# Patient Record
Sex: FEMALE | Race: WHITE | Employment: OTHER | URBAN - METROPOLITAN AREA
[De-identification: names, ages, dates, MRNs, and addresses within clinical notes are randomized per-mention and may not be internally consistent; named-entity substitution may affect disease eponyms.]

---

## 2017-01-01 ENCOUNTER — PATIENT OUTREACH (OUTPATIENT)
Dept: CASE MANAGEMENT | Age: 82
End: 2017-01-01

## 2017-01-01 ENCOUNTER — HOSPITAL ENCOUNTER (OUTPATIENT)
Dept: PHYSICAL THERAPY | Age: 82
Discharge: HOME | End: 2017-08-30
Payer: MEDICARE

## 2017-01-01 ENCOUNTER — HOSPITAL ENCOUNTER (OUTPATIENT)
Dept: PHYSICAL THERAPY | Age: 82
Discharge: HOME | End: 2017-08-23
Payer: MEDICARE

## 2017-01-01 ENCOUNTER — APPOINTMENT (OUTPATIENT)
Dept: GENERAL RADIOLOGY | Age: 82
End: 2017-01-01
Attending: EMERGENCY MEDICINE
Payer: MEDICARE

## 2017-01-01 ENCOUNTER — HOSPITAL ENCOUNTER (OUTPATIENT)
Dept: CT IMAGING | Age: 82
Discharge: HOME | End: 2017-06-12
Payer: MEDICARE

## 2017-01-01 ENCOUNTER — HOSPITAL ENCOUNTER (OUTPATIENT)
Dept: PHYSICAL THERAPY | Age: 82
Discharge: HOME | End: 2017-07-26
Payer: MEDICARE

## 2017-01-01 ENCOUNTER — HOSPITAL ENCOUNTER (OUTPATIENT)
Dept: PHYSICAL THERAPY | Age: 82
Discharge: HOME | End: 2017-07-19
Payer: MEDICARE

## 2017-01-01 ENCOUNTER — HOSPITAL ENCOUNTER (EMERGENCY)
Age: 82
Discharge: HOME | End: 2017-06-14
Attending: EMERGENCY MEDICINE
Payer: MEDICARE

## 2017-01-01 ENCOUNTER — HOSPITAL ENCOUNTER (OUTPATIENT)
Dept: PHYSICAL THERAPY | Age: 82
Discharge: HOME | End: 2017-08-09
Payer: MEDICARE

## 2017-01-01 ENCOUNTER — HOSPITAL ENCOUNTER (EMERGENCY)
Age: 82
Discharge: HOME | End: 2017-05-12
Attending: EMERGENCY MEDICINE
Payer: MEDICARE

## 2017-01-01 ENCOUNTER — HOSPITAL ENCOUNTER (OUTPATIENT)
Dept: PHYSICAL THERAPY | Age: 82
Discharge: HOME | End: 2017-08-16
Payer: MEDICARE

## 2017-01-01 ENCOUNTER — HOSPITAL ENCOUNTER (OUTPATIENT)
Dept: PHYSICAL THERAPY | Age: 82
Discharge: HOME | End: 2017-08-02
Payer: MEDICARE

## 2017-01-01 VITALS
OXYGEN SATURATION: 97 % | BODY MASS INDEX: 29.16 KG/M2 | RESPIRATION RATE: 20 BRPM | DIASTOLIC BLOOD PRESSURE: 58 MMHG | WEIGHT: 175 LBS | TEMPERATURE: 97.4 F | HEART RATE: 62 BPM | HEIGHT: 65 IN | SYSTOLIC BLOOD PRESSURE: 124 MMHG

## 2017-01-01 VITALS
RESPIRATION RATE: 18 BRPM | HEIGHT: 69 IN | DIASTOLIC BLOOD PRESSURE: 81 MMHG | WEIGHT: 175 LBS | OXYGEN SATURATION: 95 % | HEART RATE: 67 BPM | BODY MASS INDEX: 25.92 KG/M2 | TEMPERATURE: 98.5 F | SYSTOLIC BLOOD PRESSURE: 183 MMHG

## 2017-01-01 DIAGNOSIS — R47.1 DYSARTHRIA: Primary | ICD-10-CM

## 2017-01-01 DIAGNOSIS — R07.9 CHEST PAIN, UNSPECIFIED TYPE: Primary | ICD-10-CM

## 2017-01-01 DIAGNOSIS — R47.81 SLURRED SPEECH: ICD-10-CM

## 2017-01-01 DIAGNOSIS — I10 ESSENTIAL HYPERTENSION: ICD-10-CM

## 2017-01-01 DIAGNOSIS — R29.810 FACIAL DROOP: ICD-10-CM

## 2017-01-01 DIAGNOSIS — N39.0 URINARY TRACT INFECTION WITHOUT HEMATURIA, SITE UNSPECIFIED: ICD-10-CM

## 2017-01-01 DIAGNOSIS — E86.0 DEHYDRATION: ICD-10-CM

## 2017-01-01 DIAGNOSIS — R26.81 UNSTEADY GAIT: ICD-10-CM

## 2017-01-01 LAB
ALBUMIN SERPL BCP-MCNC: 3.2 G/DL (ref 3.2–4.6)
ALBUMIN SERPL BCP-MCNC: 3.4 G/DL (ref 3.2–4.6)
ALBUMIN/GLOB SERPL: 0.7 {RATIO} (ref 1.2–3.5)
ALBUMIN/GLOB SERPL: 0.8 {RATIO} (ref 1.2–3.5)
ALP SERPL-CCNC: 105 U/L (ref 50–136)
ALP SERPL-CCNC: 76 U/L (ref 50–136)
ALT SERPL-CCNC: 18 U/L (ref 12–65)
ALT SERPL-CCNC: 22 U/L (ref 12–65)
ANION GAP BLD CALC-SCNC: 10 MMOL/L (ref 7–16)
ANION GAP BLD CALC-SCNC: 7 MMOL/L (ref 7–16)
APPEARANCE UR: ABNORMAL
AST SERPL W P-5'-P-CCNC: 25 U/L (ref 15–37)
AST SERPL W P-5'-P-CCNC: 27 U/L (ref 15–37)
ATRIAL RATE: 64 BPM
ATRIAL RATE: 69 BPM
BACTERIA SPEC CULT: ABNORMAL
BACTERIA URNS QL MICRO: ABNORMAL /HPF
BACTERIA URNS QL MICRO: ABNORMAL /HPF
BASOPHILS # BLD AUTO: 0 K/UL (ref 0–0.2)
BASOPHILS # BLD AUTO: 0 K/UL (ref 0–0.2)
BASOPHILS # BLD: 0 % (ref 0–2)
BASOPHILS # BLD: 0 % (ref 0–2)
BILIRUB SERPL-MCNC: 0.3 MG/DL (ref 0.2–1.1)
BILIRUB SERPL-MCNC: 0.5 MG/DL (ref 0.2–1.1)
BILIRUB UR QL: NEGATIVE
BNP SERPL-MCNC: 202 PG/ML
BUN SERPL-MCNC: 20 MG/DL (ref 8–23)
BUN SERPL-MCNC: 29 MG/DL (ref 8–23)
CALCIUM SERPL-MCNC: 9.1 MG/DL (ref 8.3–10.4)
CALCIUM SERPL-MCNC: 9.2 MG/DL (ref 8.3–10.4)
CALCULATED P AXIS, ECG09: 48 DEGREES
CALCULATED P AXIS, ECG09: 88 DEGREES
CALCULATED R AXIS, ECG10: -2 DEGREES
CALCULATED R AXIS, ECG10: 19 DEGREES
CALCULATED T AXIS, ECG11: 138 DEGREES
CALCULATED T AXIS, ECG11: 159 DEGREES
CASTS URNS QL MICRO: 0 /LPF
CHLORIDE SERPL-SCNC: 105 MMOL/L (ref 98–107)
CHLORIDE SERPL-SCNC: 108 MMOL/L (ref 98–107)
CO2 SERPL-SCNC: 28 MMOL/L (ref 21–32)
CO2 SERPL-SCNC: 30 MMOL/L (ref 21–32)
COLOR UR: YELLOW
CREAT SERPL-MCNC: 0.94 MG/DL (ref 0.6–1)
CREAT SERPL-MCNC: 1.44 MG/DL (ref 0.6–1)
CRYSTALS URNS QL MICRO: 0 /LPF
DIAGNOSIS, 93000: NORMAL
DIAGNOSIS, 93000: NORMAL
DIFFERENTIAL METHOD BLD: ABNORMAL
DIFFERENTIAL METHOD BLD: ABNORMAL
EOSINOPHIL # BLD: 0 K/UL (ref 0–0.8)
EOSINOPHIL # BLD: 0.1 K/UL (ref 0–0.8)
EOSINOPHIL NFR BLD: 1 % (ref 0.5–7.8)
EOSINOPHIL NFR BLD: 2 % (ref 0.5–7.8)
EPI CELLS #/AREA URNS HPF: ABNORMAL /HPF
EPI CELLS #/AREA URNS HPF: ABNORMAL /HPF
ERYTHROCYTE [DISTWIDTH] IN BLOOD BY AUTOMATED COUNT: 14.6 % (ref 11.9–14.6)
ERYTHROCYTE [DISTWIDTH] IN BLOOD BY AUTOMATED COUNT: 15.1 % (ref 11.9–14.6)
GLOBULIN SER CALC-MCNC: 4.5 G/DL (ref 2.3–3.5)
GLOBULIN SER CALC-MCNC: 4.7 G/DL (ref 2.3–3.5)
GLUCOSE SERPL-MCNC: 160 MG/DL (ref 65–100)
GLUCOSE SERPL-MCNC: 83 MG/DL (ref 65–100)
GLUCOSE UR STRIP.AUTO-MCNC: NEGATIVE MG/DL
HCT VFR BLD AUTO: 41.9 % (ref 35.8–46.3)
HCT VFR BLD AUTO: 42.5 % (ref 35.8–46.3)
HGB BLD-MCNC: 13.9 G/DL (ref 11.7–15.4)
HGB BLD-MCNC: 14 G/DL (ref 11.7–15.4)
HGB UR QL STRIP: NEGATIVE
IMM GRANULOCYTES # BLD: 0 K/UL (ref 0–0.5)
IMM GRANULOCYTES # BLD: 0 K/UL (ref 0–0.5)
IMM GRANULOCYTES NFR BLD AUTO: 0.3 % (ref 0–5)
IMM GRANULOCYTES NFR BLD AUTO: 0.3 % (ref 0–5)
KETONES UR QL STRIP.AUTO: NEGATIVE MG/DL
LEUKOCYTE ESTERASE UR QL STRIP.AUTO: ABNORMAL
LYMPHOCYTES # BLD AUTO: 21 % (ref 13–44)
LYMPHOCYTES # BLD AUTO: 36 % (ref 13–44)
LYMPHOCYTES # BLD: 1.6 K/UL (ref 0.5–4.6)
LYMPHOCYTES # BLD: 2.7 K/UL (ref 0.5–4.6)
MCH RBC QN AUTO: 28.1 PG (ref 26.1–32.9)
MCH RBC QN AUTO: 28.4 PG (ref 26.1–32.9)
MCHC RBC AUTO-ENTMCNC: 32.9 G/DL (ref 31.4–35)
MCHC RBC AUTO-ENTMCNC: 33.2 G/DL (ref 31.4–35)
MCV RBC AUTO: 84.8 FL (ref 79.6–97.8)
MCV RBC AUTO: 86.2 FL (ref 79.6–97.8)
MONOCYTES # BLD: 0.3 K/UL (ref 0.1–1.3)
MONOCYTES # BLD: 0.4 K/UL (ref 0.1–1.3)
MONOCYTES NFR BLD AUTO: 5 % (ref 4–12)
MONOCYTES NFR BLD AUTO: 6 % (ref 4–12)
MUCOUS THREADS URNS QL MICRO: 0 /LPF
NEUTS SEG # BLD: 4.2 K/UL (ref 1.7–8.2)
NEUTS SEG # BLD: 5.6 K/UL (ref 1.7–8.2)
NEUTS SEG NFR BLD AUTO: 56 % (ref 43–78)
NEUTS SEG NFR BLD AUTO: 73 % (ref 43–78)
NITRITE UR QL STRIP.AUTO: POSITIVE
OTHER OBSERVATIONS,UCOM: ABNORMAL
OTHER OBSERVATIONS,UCOM: ABNORMAL
P-R INTERVAL, ECG05: 190 MS
P-R INTERVAL, ECG05: 196 MS
PH UR STRIP: 7 [PH] (ref 5–9)
PLATELET # BLD AUTO: 238 K/UL (ref 150–450)
PLATELET # BLD AUTO: 316 K/UL (ref 150–450)
PMV BLD AUTO: 10.1 FL (ref 10.8–14.1)
PMV BLD AUTO: 10.2 FL (ref 10.8–14.1)
POTASSIUM SERPL-SCNC: 3.8 MMOL/L (ref 3.5–5.1)
POTASSIUM SERPL-SCNC: 4 MMOL/L (ref 3.5–5.1)
PROT SERPL-MCNC: 7.9 G/DL (ref 6.3–8.2)
PROT SERPL-MCNC: 7.9 G/DL (ref 6.3–8.2)
PROT UR STRIP-MCNC: NEGATIVE MG/DL
Q-T INTERVAL, ECG07: 428 MS
Q-T INTERVAL, ECG07: 446 MS
QRS DURATION, ECG06: 100 MS
QRS DURATION, ECG06: 98 MS
QTC CALCULATION (BEZET), ECG08: 458 MS
QTC CALCULATION (BEZET), ECG08: 460 MS
RBC # BLD AUTO: 4.93 M/UL (ref 4.05–5.25)
RBC # BLD AUTO: 4.94 M/UL (ref 4.05–5.25)
RBC #/AREA URNS HPF: ABNORMAL /HPF
RBC #/AREA URNS HPF: ABNORMAL /HPF
SERVICE CMNT-IMP: ABNORMAL
SERVICE CMNT-IMP: ABNORMAL
SODIUM SERPL-SCNC: 143 MMOL/L (ref 136–145)
SODIUM SERPL-SCNC: 145 MMOL/L (ref 136–145)
SP GR UR REFRACTOMETRY: 1.01 (ref 1–1.02)
TROPONIN I BLD-MCNC: 0 NG/ML (ref 0–0.08)
TROPONIN I BLD-MCNC: 0.01 NG/ML (ref 0–0.08)
TROPONIN I SERPL-MCNC: <0.02 NG/ML (ref 0.02–0.05)
UROBILINOGEN UR QL STRIP.AUTO: 0.2 EU/DL (ref 0.2–1)
VENTRICULAR RATE, ECG03: 64 BPM
VENTRICULAR RATE, ECG03: 69 BPM
WBC # BLD AUTO: 7.6 K/UL (ref 4.3–11.1)
WBC # BLD AUTO: 7.6 K/UL (ref 4.3–11.1)
WBC URNS QL MICRO: >100 /HPF
WBC URNS QL MICRO: ABNORMAL /HPF

## 2017-01-01 PROCEDURE — 87186 SC STD MICRODIL/AGAR DIL: CPT | Performed by: EMERGENCY MEDICINE

## 2017-01-01 PROCEDURE — 99284 EMERGENCY DEPT VISIT MOD MDM: CPT | Performed by: EMERGENCY MEDICINE

## 2017-01-01 PROCEDURE — 99285 EMERGENCY DEPT VISIT HI MDM: CPT | Performed by: EMERGENCY MEDICINE

## 2017-01-01 PROCEDURE — 97110 THERAPEUTIC EXERCISES: CPT

## 2017-01-01 PROCEDURE — 80053 COMPREHEN METABOLIC PANEL: CPT | Performed by: EMERGENCY MEDICINE

## 2017-01-01 PROCEDURE — 96365 THER/PROPH/DIAG IV INF INIT: CPT | Performed by: EMERGENCY MEDICINE

## 2017-01-01 PROCEDURE — 71010 XR CHEST PORT: CPT

## 2017-01-01 PROCEDURE — 97163 PT EVAL HIGH COMPLEX 45 MIN: CPT

## 2017-01-01 PROCEDURE — 81015 MICROSCOPIC EXAM OF URINE: CPT | Performed by: EMERGENCY MEDICINE

## 2017-01-01 PROCEDURE — 83880 ASSAY OF NATRIURETIC PEPTIDE: CPT | Performed by: EMERGENCY MEDICINE

## 2017-01-01 PROCEDURE — 81003 URINALYSIS AUTO W/O SCOPE: CPT | Performed by: EMERGENCY MEDICINE

## 2017-01-01 PROCEDURE — 84484 ASSAY OF TROPONIN QUANT: CPT

## 2017-01-01 PROCEDURE — 85025 COMPLETE CBC W/AUTO DIFF WBC: CPT | Performed by: EMERGENCY MEDICINE

## 2017-01-01 PROCEDURE — 70450 CT HEAD/BRAIN W/O DYE: CPT

## 2017-01-01 PROCEDURE — 93005 ELECTROCARDIOGRAM TRACING: CPT | Performed by: EMERGENCY MEDICINE

## 2017-01-01 PROCEDURE — 51701 INSERT BLADDER CATHETER: CPT | Performed by: EMERGENCY MEDICINE

## 2017-01-01 PROCEDURE — 77030011943

## 2017-01-01 PROCEDURE — 87088 URINE BACTERIA CULTURE: CPT | Performed by: EMERGENCY MEDICINE

## 2017-01-01 PROCEDURE — G8978 MOBILITY CURRENT STATUS: HCPCS

## 2017-01-01 PROCEDURE — 84484 ASSAY OF TROPONIN QUANT: CPT | Performed by: EMERGENCY MEDICINE

## 2017-01-01 PROCEDURE — G8979 MOBILITY GOAL STATUS: HCPCS

## 2017-01-01 PROCEDURE — 74011250636 HC RX REV CODE- 250/636: Performed by: EMERGENCY MEDICINE

## 2017-01-01 PROCEDURE — 74011250637 HC RX REV CODE- 250/637: Performed by: EMERGENCY MEDICINE

## 2017-01-01 PROCEDURE — 87086 URINE CULTURE/COLONY COUNT: CPT | Performed by: EMERGENCY MEDICINE

## 2017-01-01 RX ORDER — LEVOFLOXACIN 5 MG/ML
500 INJECTION, SOLUTION INTRAVENOUS ONCE
Status: COMPLETED | OUTPATIENT
Start: 2017-01-01 | End: 2017-01-01

## 2017-01-01 RX ORDER — HYDRALAZINE HYDROCHLORIDE 25 MG/1
25 TABLET, FILM COATED ORAL ONCE
Status: DISCONTINUED | OUTPATIENT
Start: 2017-01-01 | End: 2017-01-01 | Stop reason: HOSPADM

## 2017-01-01 RX ORDER — LEVOFLOXACIN 500 MG/1
500 TABLET, FILM COATED ORAL DAILY
Qty: 5 TAB | Refills: 0 | Status: SHIPPED | OUTPATIENT
Start: 2017-01-01 | End: 2017-01-01

## 2017-01-01 RX ORDER — CEPHALEXIN 500 MG/1
500 CAPSULE ORAL
Status: COMPLETED | OUTPATIENT
Start: 2017-01-01 | End: 2017-01-01

## 2017-01-01 RX ORDER — CEPHALEXIN 500 MG/1
500 CAPSULE ORAL 4 TIMES DAILY
Qty: 28 CAP | Refills: 0 | Status: SHIPPED | OUTPATIENT
Start: 2017-01-01 | End: 2017-01-01

## 2017-01-01 RX ORDER — FLUCONAZOLE 150 MG/1
TABLET ORAL
Qty: 2 TAB | Refills: 0 | Status: SHIPPED | OUTPATIENT
Start: 2017-01-01 | End: 2017-01-01

## 2017-01-01 RX ADMIN — LEVOFLOXACIN 500 MG: 5 INJECTION, SOLUTION INTRAVENOUS at 17:11

## 2017-01-01 RX ADMIN — SODIUM CHLORIDE 1000 ML: 900 INJECTION, SOLUTION INTRAVENOUS at 16:16

## 2017-01-01 RX ADMIN — CEPHALEXIN 500 MG: 500 CAPSULE ORAL at 16:17

## 2017-01-24 ENCOUNTER — HOSPITAL ENCOUNTER (EMERGENCY)
Age: 82
Discharge: HOME | End: 2017-01-24
Attending: STUDENT IN AN ORGANIZED HEALTH CARE EDUCATION/TRAINING PROGRAM
Payer: MEDICARE

## 2017-01-24 VITALS
HEART RATE: 84 BPM | BODY MASS INDEX: 27.49 KG/M2 | TEMPERATURE: 97.7 F | RESPIRATION RATE: 16 BRPM | HEIGHT: 65 IN | WEIGHT: 165 LBS | DIASTOLIC BLOOD PRESSURE: 88 MMHG | OXYGEN SATURATION: 99 % | SYSTOLIC BLOOD PRESSURE: 162 MMHG

## 2017-01-24 DIAGNOSIS — N12 PYELONEPHRITIS: Primary | ICD-10-CM

## 2017-01-24 LAB
ALBUMIN SERPL BCP-MCNC: 3.3 G/DL (ref 3.2–4.6)
ALBUMIN/GLOB SERPL: 0.8 {RATIO} (ref 1.2–3.5)
ALP SERPL-CCNC: 82 U/L (ref 50–136)
ALT SERPL-CCNC: 21 U/L (ref 12–65)
ANION GAP BLD CALC-SCNC: 6 MMOL/L (ref 7–16)
AST SERPL W P-5'-P-CCNC: 32 U/L (ref 15–37)
BACTERIA URNS QL MICRO: ABNORMAL /HPF
BASOPHILS # BLD AUTO: 0 K/UL (ref 0–0.2)
BASOPHILS # BLD: 0 % (ref 0–2)
BILIRUB SERPL-MCNC: 0.4 MG/DL (ref 0.2–1.1)
BUN SERPL-MCNC: 24 MG/DL (ref 8–23)
CALCIUM SERPL-MCNC: 8.9 MG/DL (ref 8.3–10.4)
CASTS URNS QL MICRO: 0 /LPF
CHLORIDE SERPL-SCNC: 106 MMOL/L (ref 98–107)
CO2 SERPL-SCNC: 30 MMOL/L (ref 21–32)
CREAT SERPL-MCNC: 1.06 MG/DL (ref 0.6–1)
CRYSTALS URNS QL MICRO: 0 /LPF
DIFFERENTIAL METHOD BLD: ABNORMAL
EOSINOPHIL # BLD: 0.1 K/UL (ref 0–0.8)
EOSINOPHIL NFR BLD: 2 % (ref 0.5–7.8)
EPI CELLS #/AREA URNS HPF: ABNORMAL /HPF
ERYTHROCYTE [DISTWIDTH] IN BLOOD BY AUTOMATED COUNT: 16.2 % (ref 11.9–14.6)
GLOBULIN SER CALC-MCNC: 4.3 G/DL (ref 2.3–3.5)
GLUCOSE SERPL-MCNC: 122 MG/DL (ref 65–100)
HCT VFR BLD AUTO: 40.9 % (ref 35.8–46.3)
HGB BLD-MCNC: 12.9 G/DL (ref 11.7–15.4)
IMM GRANULOCYTES # BLD: 0 K/UL (ref 0–0.5)
IMM GRANULOCYTES NFR BLD AUTO: 0.1 % (ref 0–5)
LYMPHOCYTES # BLD AUTO: 35 % (ref 13–44)
LYMPHOCYTES # BLD: 2.4 K/UL (ref 0.5–4.6)
MCH RBC QN AUTO: 27.3 PG (ref 26.1–32.9)
MCHC RBC AUTO-ENTMCNC: 31.5 G/DL (ref 31.4–35)
MCV RBC AUTO: 86.5 FL (ref 79.6–97.8)
MONOCYTES # BLD: 0.4 K/UL (ref 0.1–1.3)
MONOCYTES NFR BLD AUTO: 6 % (ref 4–12)
MUCOUS THREADS URNS QL MICRO: 0 /LPF
NEUTS SEG # BLD: 3.9 K/UL (ref 1.7–8.2)
NEUTS SEG NFR BLD AUTO: 57 % (ref 43–78)
OTHER OBSERVATIONS,UCOM: ABNORMAL
PLATELET # BLD AUTO: 212 K/UL (ref 150–450)
PMV BLD AUTO: 10.6 FL (ref 10.8–14.1)
POTASSIUM SERPL-SCNC: 4.2 MMOL/L (ref 3.5–5.1)
PROT SERPL-MCNC: 7.6 G/DL (ref 6.3–8.2)
RBC # BLD AUTO: 4.73 M/UL (ref 4.05–5.25)
RBC #/AREA URNS HPF: ABNORMAL /HPF
SODIUM SERPL-SCNC: 142 MMOL/L (ref 136–145)
WBC # BLD AUTO: 6.8 K/UL (ref 4.3–11.1)
WBC URNS QL MICRO: ABNORMAL /HPF

## 2017-01-24 PROCEDURE — 74011250637 HC RX REV CODE- 250/637: Performed by: STUDENT IN AN ORGANIZED HEALTH CARE EDUCATION/TRAINING PROGRAM

## 2017-01-24 PROCEDURE — 77030011943

## 2017-01-24 PROCEDURE — 80053 COMPREHEN METABOLIC PANEL: CPT | Performed by: STUDENT IN AN ORGANIZED HEALTH CARE EDUCATION/TRAINING PROGRAM

## 2017-01-24 PROCEDURE — 85025 COMPLETE CBC W/AUTO DIFF WBC: CPT | Performed by: STUDENT IN AN ORGANIZED HEALTH CARE EDUCATION/TRAINING PROGRAM

## 2017-01-24 PROCEDURE — 99284 EMERGENCY DEPT VISIT MOD MDM: CPT | Performed by: STUDENT IN AN ORGANIZED HEALTH CARE EDUCATION/TRAINING PROGRAM

## 2017-01-24 PROCEDURE — 81003 URINALYSIS AUTO W/O SCOPE: CPT | Performed by: STUDENT IN AN ORGANIZED HEALTH CARE EDUCATION/TRAINING PROGRAM

## 2017-01-24 PROCEDURE — 51701 INSERT BLADDER CATHETER: CPT | Performed by: STUDENT IN AN ORGANIZED HEALTH CARE EDUCATION/TRAINING PROGRAM

## 2017-01-24 PROCEDURE — 81015 MICROSCOPIC EXAM OF URINE: CPT | Performed by: STUDENT IN AN ORGANIZED HEALTH CARE EDUCATION/TRAINING PROGRAM

## 2017-01-24 RX ORDER — HYDROCODONE BITARTRATE AND ACETAMINOPHEN 5; 325 MG/1; MG/1
1 TABLET ORAL
Qty: 10 TAB | Refills: 0 | Status: SHIPPED | OUTPATIENT
Start: 2017-01-24 | End: 2017-02-02 | Stop reason: SDUPTHER

## 2017-01-24 RX ORDER — CEPHALEXIN 500 MG/1
500 CAPSULE ORAL
Status: COMPLETED | OUTPATIENT
Start: 2017-01-24 | End: 2017-01-24

## 2017-01-24 RX ORDER — CEPHALEXIN 500 MG/1
500 CAPSULE ORAL 4 TIMES DAILY
Qty: 28 CAP | Refills: 0 | Status: SHIPPED | OUTPATIENT
Start: 2017-01-24 | End: 2017-01-31

## 2017-01-24 RX ADMIN — CEPHALEXIN 500 MG: 500 CAPSULE ORAL at 15:30

## 2017-01-24 NOTE — ED PROVIDER NOTES
HPI Comments: 49-year-old female patient presents to the emergency department with reports of dysuria and bilateral flank pain. Patient states her symptoms have been progressive over the past 3 days. She reports painful urination and increased urinary frequency at home. She denies any odd color or smell to her urine. She reports a history of similar symptoms in the past with UTI diagnosis. Patient most recently treated in September for the symptoms. She denies any fever, chills, chest pain, nausea, vomiting, changes in bowel habits. She reports some mild abdominal discomfort over the bladder. Patient denies any other symptoms or current therapies for her complaints at this time. Patient is a 80 y.o. female presenting with urinary pain. The history is provided by the patient. No  was used. Urinary Pain    The current episode started more than 2 days ago. The problem occurs every urination. The problem has not changed since onset. The quality of the pain is described as burning. The pain is at a severity of 4/10. The pain is mild. There has been no fever. The fever has been present for less than 1 day. Associated symptoms include frequency, urgency, flank pain and abdominal pain. Pertinent negatives include no chills, no sweats, no nausea, no vomiting, no discharge, no hematuria, no hesitancy, no vaginal discharge, no penile discharge and no back pain. The patient is not pregnant. She has tried nothing for the symptoms. The treatment provided no relief. Her past medical history is significant for recurrent UTIs. Her past medical history does not include kidney stones, single kidney, urological procedure, urinary stasis, catheterization or urinary catheter problem.         Past Medical History:   Diagnosis Date    Abdominal aneurysm without mention of rupture 09/29/09    Abdominal pain, epigastric 05/06/10    Abdominal pain, generalized 12/07/10    Acute renal failure ([facility] [address] 5/18/2011  Acute, but ill-defined, cerebrovascular disease 09/29/09    Aneurysm ([facility] [address]      present AAA - pt states to small to operate     Anxiety state, unspecified 12/06/13    Arrhythmia       [facility] cardiology ([place])    Arthritis     Atrial fibrillation ([facility] [address] 12/21/09    Backache, unspecified 08/26/10    CAD (coronary artery disease) 2000     CABG - MI in 2000    CHF - Chronic combined systolic \T\ diastolic 0/7/0516     OV: 11/12/15 7/12/12: EF 27%, dyskinesis inferior wall and apex - patient opted for no additional therapy, no invasive evaluation or treatment including device therapy, therefore echo has not been repeated mobile apical thrombus - Xarelto    Chronic pain     Chronic systolic heart failure (HCC) 03/22/10    Chronic UTI     CKD (chronic kidney disease), unspecified 7/6/2016     OV: 11/12/15 baseline Cr 1.7    Congestive heart failure, unspecified 10/28/09    Coronary atherosclerosis of unspecified type of vessel, native or graft 09/22/09    Dehydration 10/28/09    Dementia 11/27/2009    Depression     Depressive disorder, not elsewhere classified 07/26/10    Diarrhea 09/05/12    Diverticulitis of colon (without mention of hemorrhage) 12/29/09    Dysuria 12/21/09    Gastrointestinal disorder      diverticulitis    GERD (gastroesophageal reflux disease) 11/27/2009     controls with meds     Heart failure (HCC)      systolic, EF 65% in 7846    Hematuria, unspecified 03/10/10    Hemorrhage of gastrointestinal tract, unspecified 12/30/09    Hemorrhage of rectum and anus 12/29/09    HTN 09/22/09     controlled with meds     Hypotension due to drugs 7/6/2016     OV: 11/12/15 limits therapy    Hypotension, unspecified 10/13/09    Hypothyroidism     Insomnia, unspecified 12/06/13    Intestinal infection due to other organism, not elsewhere classified 03/22/10    Irritable bowel syndrome 05/12/10    Lumbago 09/09/10    Midline low back pain with sciatica     Mononeuritis of unspecified site 08/22/13    Muscle weakness (generalized) 10/13/09    Nonspecific abnormal results of liver function study 12/07/10    Nonspecific elevation of levels of transaminase or lactic acid dehydrogenase (LDH) 11/09/10    Other and unspecified hyperlipidemia 09/22/09    Other degenerative diseases of the basal ganglia ([facility] [address] 05/12/10    Other ill-defined conditions(799.89)      hypercholesterolemia    Other nonspecific finding on examination of urine 02/17/10    Other persistent mental disorders due to conditions classified elsewhere 03/22/10    Other primary cardiomyopathies ([facility] [address] 11/04/09    Pain in limb 03/28/14    Panic attacks     Psychiatric disorder      anxiety/depression     Pyelonephritis, unspecified 5/17/2011    S/P Coronary Artery Bypass Graft 7/6/2016     OV: 11/12/15 HOSKINS to LAD, patent at cath 2005, no other significant obstructive disease.     Spinal stenosis, unspecified region other than cervical 01/04/13    Stroke [facility])      TIA - no residual weakness - 14 years ago     Unspecified adverse effect of anesthesia      pt states difficult to put to sleep     Unspecified hypothyroidism 10/28/09    Unspecified symptom associated with female genital organs 09/09/10    Unspecified urinary incontinence 02/17/10    Urosepsis 9/4/2014       Past Surgical History:   Procedure Laterality Date    Pr cabg, arterial, three  2000    Hx total abdominal hysterectomy      Hx bladder suspension      Pr cardiac surg procedure unlist       bypass    Hx appendectomy      Hx cholecystectomy      Hx orthopaedic       back x 2    Hx hernia repair       umbilical     Hx gyn       hysterectomy     Hx heent       bilateral cataracts with lens implants     Hx colonoscopy      Hx heart catheterization  4/26/94         Family History:   Problem Relation Age of Onset    Heart Disease Mother     No Known Problems Father        Social History     Social History    Marital status:  Spouse name: N/A    Number of children: N/A    Years of education: N/A     Occupational History    Not on file. Social History Main Topics    Smoking status: Former Smoker     Quit date: 12/5/1993    Smokeless tobacco: Never Used    Alcohol use No    Drug use: No    Sexual activity: No     Other Topics Concern    Not on file     Social History Narrative         ALLERGIES: Ivp dye [fd and c blue no.1]; Codeine; Iodine; Macrodantin [nitrofurantoin macrocrystalline]; Morphine; Povidone-iodine; and Sulfa (sulfonamide antibiotics)    Review of Systems   Constitutional: Negative for chills, diaphoresis and fever. HENT: Negative for congestion, sneezing and sore throat. Eyes: Negative for visual disturbance. Respiratory: Negative for cough, chest tightness, shortness of breath and wheezing. Cardiovascular: Negative for chest pain and leg swelling. Gastrointestinal: Positive for abdominal pain. Negative for blood in stool, diarrhea, nausea and vomiting. Endocrine: Negative for polyuria. Genitourinary: Positive for flank pain, frequency and urgency. Negative for difficulty urinating, dysuria, hematuria, hesitancy, penile discharge and vaginal discharge. Musculoskeletal: Negative for back pain, myalgias, neck pain and neck stiffness. Skin: Negative for color change and rash. Neurological: Negative for dizziness, syncope, speech difficulty, weakness, light-headedness, numbness and headaches. Psychiatric/Behavioral: Negative for behavioral problems. All other systems reviewed and are negative. Vitals:    01/24/17 1051   BP: (!) 156/95   Pulse: 88   Resp: 16   Temp: 98 °F (36.7 °C)   SpO2: 100%   Weight: 74.8 kg (165 lb)   Height: 5' 5\" (1.651 m)            Physical Exam   Constitutional: She is oriented to person, place, and time. She appears well-developed and well-nourished. No distress. Pleasant appearing elderly patient alert and oriented ×3. No acute distress.    HENT: Head: Normocephalic and atraumatic. Eyes: Conjunctivae and EOM are normal. Pupils are equal, round, and reactive to light. Neck: Normal range of motion and full passive range of motion without pain. Neck supple. No JVD present. No tracheal deviation present. Cardiovascular: Normal rate, regular rhythm, S1 normal, S2 normal, normal heart sounds and intact distal pulses. Exam reveals no gallop, no distant heart sounds and no friction rub. No murmur heard. Pulmonary/Chest: Effort normal and breath sounds normal. No accessory muscle usage or stridor. No tachypnea and no bradypnea. No respiratory distress. She has no decreased breath sounds. She has no wheezes. She has no rhonchi. She has no rales. She exhibits no tenderness. Abdominal: Soft. Normal appearance. She exhibits no distension and no mass. There is no hepatosplenomegaly, splenomegaly or hepatomegaly. There is tenderness in the suprapubic area. There is no rigidity, no rebound, no guarding, no CVA tenderness, no tenderness at McBurney's point and negative Talavera's sign. Mild discomfort over the bladder with palpation of the suprapubic area. Musculoskeletal: Normal range of motion. She exhibits no edema, tenderness or deformity. Neurological: She is alert and oriented to person, place, and time. No cranial nerve deficit. Skin: Skin is warm and dry. No rash noted. She is not diaphoretic. Psychiatric: She has a normal mood and affect. Her behavior is normal.        MDM  Number of Diagnoses or Management Options  Pyelonephritis: new and requires workup  Diagnosis management comments: Patient has no white count and her creatinine appears improved from last visit. Urinalysis pending, Afebrile with stable vitals. No evidence of sepsis. Urinalysis shows bacteria +4. Patient will be treated with by mouth Keflex.          Amount and/or Complexity of Data Reviewed  Clinical lab tests: ordered and reviewed    Risk of Complications, Morbidity, and/or Mortality  Presenting problems: moderate  Diagnostic procedures: low  Management options: moderate    Patient Progress  Patient progress: stable    ED Course       Procedures

## 2017-01-24 NOTE — ED NOTES
The patient and caregiver was given their discharge instructions and  was given prescriptions. The  patient and caregiver verbalized understanding and had no additional questions. The patient was alert and was discharged via Wheelchair, without additional complaints at time of discharge.   No apparent distress noted

## 2017-01-25 ENCOUNTER — PATIENT OUTREACH (OUTPATIENT)
Dept: CASE MANAGEMENT | Age: 82
End: 2017-01-25

## 2017-01-25 NOTE — PROGRESS NOTES
This note will not be viewable in [address] ED Discharge 1/24/2017    Patient engaged with RN case manager, Judie Hall.     Will forward chart to RN

## 2017-03-06 PROBLEM — M75.01 ADHESIVE CAPSULITIS OF BOTH SHOULDERS: Chronic | Status: ACTIVE | Noted: 2017-03-06

## 2017-03-06 PROBLEM — M75.02 ADHESIVE CAPSULITIS OF BOTH SHOULDERS: Chronic | Status: ACTIVE | Noted: 2017-03-06

## 2017-03-27 ENCOUNTER — APPOINTMENT (OUTPATIENT)
Dept: GENERAL RADIOLOGY | Age: 82
End: 2017-03-27
Attending: EMERGENCY MEDICINE
Payer: MEDICARE

## 2017-03-27 ENCOUNTER — HOSPITAL ENCOUNTER (EMERGENCY)
Age: 82
Discharge: HOME | End: 2017-03-27
Attending: EMERGENCY MEDICINE
Payer: MEDICARE

## 2017-03-27 VITALS
WEIGHT: 166 LBS | BODY MASS INDEX: 27.66 KG/M2 | TEMPERATURE: 98.8 F | SYSTOLIC BLOOD PRESSURE: 114 MMHG | DIASTOLIC BLOOD PRESSURE: 78 MMHG | RESPIRATION RATE: 16 BRPM | HEIGHT: 65 IN | HEART RATE: 88 BPM | OXYGEN SATURATION: 96 %

## 2017-03-27 DIAGNOSIS — R05.9 COUGH: Primary | ICD-10-CM

## 2017-03-27 LAB
ALBUMIN SERPL BCP-MCNC: 3 G/DL (ref 3.2–4.6)
ALBUMIN/GLOB SERPL: 0.6 {RATIO} (ref 1.2–3.5)
ALP SERPL-CCNC: 79 U/L (ref 50–136)
ALT SERPL-CCNC: 16 U/L (ref 12–65)
ANION GAP BLD CALC-SCNC: 8 MMOL/L (ref 7–16)
AST SERPL W P-5'-P-CCNC: 19 U/L (ref 15–37)
ATRIAL RATE: 87 BPM
BASOPHILS # BLD AUTO: 0 K/UL (ref 0–0.2)
BASOPHILS # BLD: 0 % (ref 0–2)
BILIRUB SERPL-MCNC: 0.5 MG/DL (ref 0.2–1.1)
BUN SERPL-MCNC: 24 MG/DL (ref 8–23)
CALCIUM SERPL-MCNC: 9.4 MG/DL (ref 8.3–10.4)
CALCULATED P AXIS, ECG09: 17 DEGREES
CALCULATED R AXIS, ECG10: -8 DEGREES
CALCULATED T AXIS, ECG11: 155 DEGREES
CHLORIDE SERPL-SCNC: 104 MMOL/L (ref 98–107)
CO2 SERPL-SCNC: 28 MMOL/L (ref 21–32)
CREAT SERPL-MCNC: 1.24 MG/DL (ref 0.6–1)
DIAGNOSIS, 93000: NORMAL
DIFFERENTIAL METHOD BLD: ABNORMAL
EOSINOPHIL # BLD: 0.2 K/UL (ref 0–0.8)
EOSINOPHIL NFR BLD: 2 % (ref 0.5–7.8)
ERYTHROCYTE [DISTWIDTH] IN BLOOD BY AUTOMATED COUNT: 14.6 % (ref 11.9–14.6)
GLOBULIN SER CALC-MCNC: 5 G/DL (ref 2.3–3.5)
GLUCOSE SERPL-MCNC: 188 MG/DL (ref 65–100)
HCT VFR BLD AUTO: 40 % (ref 35.8–46.3)
HGB BLD-MCNC: 12.8 G/DL (ref 11.7–15.4)
IMM GRANULOCYTES # BLD: 0 K/UL (ref 0–0.5)
IMM GRANULOCYTES NFR BLD AUTO: 0.3 % (ref 0–5)
LYMPHOCYTES # BLD AUTO: 17 % (ref 13–44)
LYMPHOCYTES # BLD: 1.8 K/UL (ref 0.5–4.6)
MCH RBC QN AUTO: 28.3 PG (ref 26.1–32.9)
MCHC RBC AUTO-ENTMCNC: 32 G/DL (ref 31.4–35)
MCV RBC AUTO: 88.5 FL (ref 79.6–97.8)
MONOCYTES # BLD: 0.8 K/UL (ref 0.1–1.3)
MONOCYTES NFR BLD AUTO: 8 % (ref 4–12)
NEUTS SEG # BLD: 7.7 K/UL (ref 1.7–8.2)
NEUTS SEG NFR BLD AUTO: 73 % (ref 43–78)
P-R INTERVAL, ECG05: 188 MS
PLATELET # BLD AUTO: 263 K/UL (ref 150–450)
PMV BLD AUTO: 10.5 FL (ref 10.8–14.1)
POTASSIUM SERPL-SCNC: 4.8 MMOL/L (ref 3.5–5.1)
PROT SERPL-MCNC: 8 G/DL (ref 6.3–8.2)
Q-T INTERVAL, ECG07: 402 MS
QRS DURATION, ECG06: 90 MS
QTC CALCULATION (BEZET), ECG08: 483 MS
RBC # BLD AUTO: 4.52 M/UL (ref 4.05–5.25)
SODIUM SERPL-SCNC: 140 MMOL/L (ref 136–145)
TROPONIN I SERPL-MCNC: <0.04 NG/ML (ref 0.02–0.05)
VENTRICULAR RATE, ECG03: 87 BPM
WBC # BLD AUTO: 10.4 K/UL (ref 4.3–11.1)

## 2017-03-27 PROCEDURE — 99283 EMERGENCY DEPT VISIT LOW MDM: CPT | Performed by: EMERGENCY MEDICINE

## 2017-03-27 PROCEDURE — 85025 COMPLETE CBC W/AUTO DIFF WBC: CPT | Performed by: EMERGENCY MEDICINE

## 2017-03-27 PROCEDURE — 80053 COMPREHEN METABOLIC PANEL: CPT | Performed by: EMERGENCY MEDICINE

## 2017-03-27 PROCEDURE — 93005 ELECTROCARDIOGRAM TRACING: CPT | Performed by: EMERGENCY MEDICINE

## 2017-03-27 PROCEDURE — 84484 ASSAY OF TROPONIN QUANT: CPT | Performed by: EMERGENCY MEDICINE

## 2017-03-27 PROCEDURE — 71010 XR CHEST PORT: CPT

## 2017-03-27 RX ORDER — BENZONATATE 100 MG/1
100 CAPSULE ORAL
Qty: 20 CAP | Refills: 0 | Status: SHIPPED | OUTPATIENT
Start: 2017-03-27 | End: 2017-04-03

## 2017-03-27 NOTE — ED NOTES
I have reviewed discharge instructions with the patient and caregiver. The patient and caregiver verbalized understanding. Patient is in no acute distress. Patient's caretaker has discharge instructions in hand and is aware that patient's prescription has been sent to pharmacy of her choice. Patient assisted to personal vehicle with caretaker who will be taking her home.

## 2017-03-27 NOTE — ED PROVIDER NOTES
HPI Comments: She has had some recurring cough since approximately last Wednesday. She contacted her [facility] - prescription for a Z-Dewayne was called in that she is completed all except for the last dose. She is been taking some aspirin for discomfort and possible fever though fevers not confirmed. She has vomited a few times after prolonged period of cough. Patient is a 80 y.o. female presenting with cough. The history is provided by the patient and a caregiver. Cough   This is a new problem. The current episode started more than 2 days ago. The problem has not changed since onset. The cough is non-productive. There has been no fever. Pertinent negatives include no chills, no sweats, no sore throat, no myalgias, no wheezing, no nausea and no vomiting. She has tried antibiotics for the symptoms.         Past Medical History:   Diagnosis Date    Abdominal aneurysm without mention of rupture 09/29/09    Abdominal pain, epigastric 05/06/10    Abdominal pain, generalized 12/07/10    Acute renal failure ([address] 5/18/2011    Acute, but ill-defined, cerebrovascular disease 09/29/09    Aneurysm ([address]     present AAA - pt states to small to operate     Anxiety state, unspecified 12/06/13    Arrhythmia      [facility] cardiology ([place])    Arthritis     Atrial fibrillation ([address] 12/21/09    Backache, unspecified 08/26/10    CAD (coronary artery disease) 2000    CABG - MI in 2000    CHF - Chronic combined systolic \T\ diastolic 0/6/3252    OV: 74/34/05 7/12/12: EF 27%, dyskinesis inferior wall and apex - patient opted for no additional therapy, no invasive evaluation or treatment including device therapy, therefore echo has not been repeated mobile apical thrombus - Xarelto    Chronic pain     Chronic systolic heart failure ([address] 03/22/10    Chronic UTI     CKD (chronic kidney disease), unspecified 7/6/2016    OV: 11/12/15 baseline Cr 1.7    Congestive heart failure, unspecified 10/28/09    Coronary atherosclerosis of unspecified type of vessel, native or graft 09/22/09    Dehydration 10/28/09    Dementia 11/27/2009    Depression     Depressive disorder, not elsewhere classified 07/26/10    Diarrhea 09/05/12    Diverticulitis of colon (without mention of hemorrhage) 12/29/09    Dysuria 12/21/09    Gastrointestinal disorder     diverticulitis    GERD (gastroesophageal reflux disease) 11/27/2009    controls with meds     Heart failure (HCC)     systolic, EF 07% in 4510    Hematuria, unspecified 03/10/10    Hemorrhage of gastrointestinal tract, unspecified 12/30/09    Hemorrhage of rectum and anus 12/29/09    HTN 09/22/09    controlled with meds     Hypotension due to drugs 7/6/2016    OV: 11/12/15 limits therapy    Hypotension, unspecified 10/13/09    Hypothyroidism     Insomnia, unspecified 12/06/13    Intestinal infection due to other organism, not elsewhere classified 03/22/10    Irritable bowel syndrome 05/12/10    Lumbago 09/09/10    Midline low back pain with sciatica     Mononeuritis of unspecified site 08/22/13    Muscle weakness (generalized) 10/13/09    Nonspecific abnormal results of liver function study 12/07/10    Nonspecific elevation of levels of transaminase or lactic acid dehydrogenase (LDH) 11/09/10    Other and unspecified hyperlipidemia 09/22/09    Other degenerative diseases of the basal ganglia ([address] 05/12/10    Other ill-defined conditions(799.89)     hypercholesterolemia    Other nonspecific finding on examination of urine 02/17/10    Other persistent mental disorders due to conditions classified elsewhere 03/22/10    Other primary cardiomyopathies ([address] 11/04/09    Pain in limb 03/28/14    Panic attacks     Psychiatric disorder     anxiety/depression     Pyelonephritis, unspecified 5/17/2011    S/P Coronary Artery Bypass Graft 7/6/2016    OV: 11/12/15 HOSKINS to LAD, patent at cath 2005, no other significant obstructive disease.     Spinal stenosis, unspecified region other than cervical 01/04/13    Stroke [facility])     TIA - no residual weakness - 14 years ago     Unspecified adverse effect of anesthesia     pt states difficult to put to sleep     Unspecified hypothyroidism 10/28/09    Unspecified symptom associated with female genital organs 09/09/10    Unspecified urinary incontinence 02/17/10    Urosepsis 9/4/2014       Past Surgical History:   Procedure Laterality Date    CABG, ARTERIAL, THREE  2000    CARDIAC SURG PROCEDURE UNLIST      bypass    HX APPENDECTOMY      HX BLADDER SUSPENSION      HX CHOLECYSTECTOMY      HX COLONOSCOPY      HX GYN      hysterectomy     HX HEART CATHETERIZATION  4/26/94    HX HEENT      bilateral cataracts with lens implants     HX HERNIA REPAIR      umbilical     HX ORTHOPAEDIC      back x 2    HX TOTAL ABDOMINAL HYSTERECTOMY           Family History:   Problem Relation Age of Onset    Heart Disease Mother     No Known Problems Father        Social History     Social History    Marital status:      Spouse name: N/A    Number of children: N/A    Years of education: N/A     Occupational History    Not on file. Social History Main Topics    Smoking status: Former Smoker     Quit date: 12/5/1993    Smokeless tobacco: Never Used    Alcohol use No    Drug use: No    Sexual activity: No     Other Topics Concern    Not on file     Social History Narrative         ALLERGIES: Ivp dye [fd and c blue no.1]; Codeine; Iodine; Macrodantin [nitrofurantoin macrocrystalline]; Morphine; Povidone-iodine; and Sulfa (sulfonamide antibiotics)    Review of Systems   Constitutional: Negative for chills. HENT: Negative for sore throat. Respiratory: Positive for cough. Negative for wheezing. Gastrointestinal: Negative for nausea and vomiting. Genitourinary: Negative. Musculoskeletal: Negative for myalgias. All other systems reviewed and are negative.       Vitals:    03/27/17 1216   BP: 97/61 Pulse: 90   Resp: 17   Temp: 98.8 °F (37.1 °C)   SpO2: 95%   Weight: 75.3 kg (166 lb)   Height: 5' 5\" (1.651 m)            Physical Exam   Constitutional: She appears well-developed and well-nourished. No distress. HENT:   Head: Atraumatic. Mouth/Throat: Oropharynx is clear and moist.   Eyes: No scleral icterus. Neck: Neck supple. Cardiovascular: Normal rate. Pulmonary/Chest: Effort normal. No respiratory distress. She has no wheezes. She has no rales. No wheeze/ good/equal/clear exchange   Abdominal: Soft. There is no tenderness. Musculoskeletal: Normal range of motion. She exhibits no edema. No signif edema   Neurological: She is alert. Skin: Skin is warm and dry. Psychiatric: Thought content normal.   Nursing note and vitals reviewed. MDM  Number of Diagnoses or Management Options  Cough:   Diagnosis management comments: URI type sx. No evidence of pneumonia.  No wheeze         Amount and/or Complexity of Data Reviewed  Clinical lab tests: reviewed  Tests in the radiology section of CPT®: reviewed  Obtain history from someone other than the patient: yes (aide)  Independent visualization of images, tracings, or specimens: yes    Risk of Complications, Morbidity, and/or Mortality  Presenting problems: moderate  Diagnostic procedures: low  Management options: moderate    Patient Progress  Patient progress: stable    ED Course       Procedures      Recent Results (from the past 12 hour(s))   EKG, 12 LEAD, INITIAL    Collection Time: 03/27/17 12:28 PM   Result Value Ref Range    Ventricular Rate 87 BPM    Atrial Rate 87 BPM    P-R Interval 188 ms    QRS Duration 90 ms    Q-T Interval 402 ms    QTC Calculation (Bezet) 483 ms    Calculated P Axis 17 degrees    Calculated R Axis -8 degrees    Calculated T Axis 155 degrees    Diagnosis       Normal sinus rhythm  Septal infarct (cited on or before 07-SEP-2000)  T wave abnormality, consider anterolateral ischemia  Abnormal ECG  When compared with ECG of 06-SEP-2016 13:57,  Inverted T waves have replaced nonspecific T wave abnormality in   Anterolateral leads  QT has lengthened  Confirmed by ST STEPHY COULTER MD (), SHAWNEE PALMER ([id number]) on 3/27/2017 1:10:32 PM     CBC WITH AUTOMATED DIFF    Collection Time: 03/27/17 12:37 PM   Result Value Ref Range    WBC 10.4 4.3 - 11.1 K/uL    RBC 4.52 4.05 - 5.25 M/uL    HGB 12.8 11.7 - 15.4 g/dL    HCT 40.0 35.8 - 46.3 %    MCV 88.5 79.6 - 97.8 FL    MCH 28.3 26.1 - 32.9 PG    MCHC 32.0 31.4 - 35.0 g/dL    RDW 14.6 11.9 - 14.6 %    PLATELET 593 157 - 197 K/uL    MPV 10.5 (L) 10.8 - 14.1 FL    DF AUTOMATED      NEUTROPHILS 73 43 - 78 %    LYMPHOCYTES 17 13 - 44 %    MONOCYTES 8 4.0 - 12.0 %    EOSINOPHILS 2 0.5 - 7.8 %    BASOPHILS 0 0.0 - 2.0 %    IMMATURE GRANULOCYTES 0.3 0.0 - 5.0 %    ABS. NEUTROPHILS 7.7 1.7 - 8.2 K/UL    ABS. LYMPHOCYTES 1.8 0.5 - 4.6 K/UL    ABS. MONOCYTES 0.8 0.1 - 1.3 K/UL    ABS. EOSINOPHILS 0.2 0.0 - 0.8 K/UL    ABS. BASOPHILS 0.0 0.0 - 0.2 K/UL    ABS. IMM. GRANS. 0.0 0.0 - 0.5 K/UL   METABOLIC PANEL, COMPREHENSIVE    Collection Time: 03/27/17 12:37 PM   Result Value Ref Range    Sodium 140 136 - 145 mmol/L    Potassium 4.8 3.5 - 5.1 mmol/L    Chloride 104 98 - 107 mmol/L    CO2 28 21 - 32 mmol/L    Anion gap 8 7 - 16 mmol/L    Glucose 188 (H) 65 - 100 mg/dL    BUN 24 (H) 8 - 23 MG/DL    Creatinine 1.24 (H) 0.6 - 1.0 MG/DL    GFR est AA 53 (L) >60 ml/min/1.73m2    GFR est non-AA 44 (L) >60 ml/min/1.73m2    Calcium 9.4 8.3 - 10.4 MG/DL    Bilirubin, total 0.5 0.2 - 1.1 MG/DL    ALT (SGPT) 16 12 - 65 U/L    AST (SGOT) 19 15 - 37 U/L    Alk.  phosphatase 79 50 - 136 U/L    Protein, total 8.0 6.3 - 8.2 g/dL    Albumin 3.0 (L) 3.2 - 4.6 g/dL    Globulin 5.0 (H) 2.3 - 3.5 g/dL    A-G Ratio 0.6 (L) 1.2 - 3.5     TROPONIN I    Collection Time: 03/27/17 12:37 PM   Result Value Ref Range    Troponin-I, Qt. <0.04 0.02 - 0.05 NG/ML          Chest X-ray 3/27/2017 1:55 PM     Clinical indication: Cough with shortness of breath for one day.      Comparison: 10/10/2016.     Findings: Upright AP portable chest at 1355. Sternotomy wires are intact. Mediastinal clips.     Improved overall aeration of the lungs. No new focal airspace consolidations nor  edema. Stable cardiomediastinal contour with atherosclerotic aorta. No overt  effusions.     IMPRESSION  IMPRESSION:     1. Improved overall aeration.  Otherwise, no acute abnormality.       Imaging

## 2017-03-27 NOTE — ED TRIAGE NOTES
Pt in c/o non-productive cough x 1 week. States started on zpak by pcp. States fever and weakness at home per caregiver. Pt c/o chest wall pain when coughing.  Denies n/v/d.

## 2017-03-27 NOTE — DISCHARGE INSTRUCTIONS
Cough: Care Instructions  Your Care Instructions  A cough is your body's response to something that bothers your throat or airways. Many things can cause a cough. You might cough because of a cold or the flu, bronchitis, or asthma. Smoking, postnasal drip, allergies, and stomach acid that backs up into your throat also can cause coughs. A cough is a symptom, not a disease. Most coughs stop when the cause, such as a cold, goes away. You can take a few steps at home to cough less and feel better. Follow-up care is a key part of your treatment and safety. Be sure to make and go to all appointments, and call your doctor if you are having problems. It's also a good idea to know your test results and keep a list of the medicines you take. How can you care for yourself at home? · Drink lots of water and other fluids. This helps thin the mucus and soothes a dry or sore throat. Honey or lemon juice in hot water or tea may ease a dry cough. · Take cough medicine as directed by your doctor. · Prop up your head on pillows to help you breathe and ease a dry cough. · Try cough drops to soothe a dry or sore throat. Cough drops don't stop a cough. Medicine-flavored cough drops are no better than candy-flavored drops or hard candy. · Do not smoke. Avoid secondhand smoke. If you need help quitting, talk to your doctor about stop-smoking programs and medicines. These can increase your chances of quitting for good. When should you call for help? Call 911 anytime you think you may need emergency care. For example, call if:  · You have severe trouble breathing. Call your doctor now or seek immediate medical care if:  · You cough up blood. · You have new or worse trouble breathing. · You have a new or higher fever. · You have a new rash.   Watch closely for changes in your health, and be sure to contact your doctor if:  · You cough more deeply or more often, especially if you notice more mucus or a change in the color of your mucus. · You have new symptoms, such as a sore throat, an earache, or sinus pain. · You do not get better as expected. Where can you learn more? Go to http://krista-apryl.info/. Enter D279 in the search box to learn more about \"Cough: Care Instructions. \"  Current as of: May 27, 2016  Content Version: 11.1  © 8946-7086 [email or web address]. Care instructions adapted under license by [employer] (which disclaims liability or warranty for this information). If you have questions about a medical condition or this instruction, always ask your healthcare professional. [address] any warranty or liability for your use of this information.

## 2017-03-27 NOTE — PROGRESS NOTES
Visited with patient. Caretaker Kendal Cabrera is at the bedside and states that she will tell patient's daughter not to come since it looks like patient will go home. Notified caretaker that [address] has made several attempts to call daughter to help with wheelchair and mattress topper. Caretaker states 'I don't know why she won't clean out her messages' and suggests we text patient's daughter. States they still have not gotten the DME. Given my contact information and Rosanne Alston  to give to daughter. Notified Doroteo Crooks via email.

## 2017-03-28 ENCOUNTER — PATIENT OUTREACH (OUTPATIENT)
Dept: CASE MANAGEMENT | Age: 82
End: 2017-03-28

## 2017-03-28 NOTE — PROGRESS NOTES
[facility] initial Fredi Arias outreach for ER visit on 3/27/17. Unable to reach patient or daughter. Unable to leave message due to mailbox being full. [facility] will continue to attempt outreach. This note will not be viewable in [address]

## 2017-03-28 NOTE — PROGRESS NOTES
This note will not be viewable in [address] Patient engaged with RN case manager, Danial Mcpherson. Will forward chart.

## 2017-03-29 ENCOUNTER — PATIENT OUTREACH (OUTPATIENT)
Dept: CASE MANAGEMENT | Age: 82
End: 2017-03-29

## 2017-03-29 NOTE — PROGRESS NOTES
CCM 2nd outreach attempt to complete LESIA assessment. Spoke with private poonamter, Monserrat Bond. Ms. Jeramie Lawler states CCM will need to speak with patient's daughter. Attempt to outreach to daughter unsuccessful, mailbox full. Ms. Jeramie Lawler reports patient's daughter is difficult to contact but will call her routinely to check on patient. Contact information provided to francis requesting a return call from daughter. Ms. Jeramie Lawler did report that patient obtained Tessalon Perles prescribed at ER discharge. CCM discussed the importance of PCP follow up appointment with francis. Sparkle Edgar will discuss with daughter. This note will not be viewable in [address]

## 2017-04-06 ENCOUNTER — PATIENT OUTREACH (OUTPATIENT)
Dept: CASE MANAGEMENT | Age: 82
End: 2017-04-06

## 2017-04-11 ENCOUNTER — PATIENT OUTREACH (OUTPATIENT)
Dept: CASE MANAGEMENT | Age: 82
End: 2017-04-11

## 2017-04-11 NOTE — PROGRESS NOTES
CCM follow up call. Spoke with patient's daughter. Daughter reports she has not heard from DME provider regarding wheelchair. CCM placed call to [address] Left message with Angelia Lee requesting a return call regarding progress of wheelchair. Daughter notified of CCM call to [address]. CCM will follow up with daughter after return call from [address] at [address] This note will not be viewable in [address]

## 2017-04-13 ENCOUNTER — PATIENT OUTREACH (OUTPATIENT)
Dept: CASE MANAGEMENT | Age: 82
End: 2017-04-13

## 2017-04-13 NOTE — PROGRESS NOTES
CCM follow up call to inform patient's daughter of progress with wheelchair. Unable to leave message mailbox full. Enoc Tripp at [facility] states she still needs to check her inbox to verify PCP has returned completed paperwork. If the paperwork is not in her inbox she will fax again to PCP office. Enoc Tripp agreed to notify CCM of progress. CCM will follow up with daughter. This note will not be viewable in [address]

## 2017-04-18 NOTE — PROGRESS NOTES
CCM follow up call. Patient reports she has not been contacted by DME company regarding her wheelchair. [facility] notified  She Rivera at [facility]. She Rivera reports she has not received signed paperwork from PCP. She will fax again today. PCP office notified by [facility] of incoming fax. PCP to sign, date and return to DME provider. Attempted to notify patient's daughter of progress. Unable to reach mail box full. This note will not be viewable in [address]

## 2017-04-21 NOTE — PROGRESS NOTES
CCM follow up call to [address] to verify receipt of completed paperwork from PCP. [facility] Left message requesting a return call to verify receipt of required paperwork for patient to obtain wheelchair. This note will not be viewable in [address]

## 2017-04-25 NOTE — PROGRESS NOTES
CCM follow up call regarding wheelchair. DME provider waiting on PCP to sign paperwork. Spoke with Carly Mathew at [facility] office. Dr. Heidy Sharp will be back in the office 5/1/17 she will have him sign and fax to DME provider at that time. CCM notified patient's daughter of above information. This note will not be viewable in [address]

## 2017-05-03 NOTE — PROGRESS NOTES
CCM follow up call to DME provider to verify receipt Medicare Form from PCP's office. Form not received. CCM notified PCP's office. Irais to have Dr. Pamela Ng to sign tomorrow due to him being out of the office this afternoon. Baron Hunter will fax to DME provider when signature obtained. This note will not be viewable in [address]

## 2017-05-12 NOTE — ED NOTES
I have reviewed discharge instructions with the patient and caregiver. The patient and caregiver verbalized understanding. Patient discharged via [facility] to [place] [address] with daughter who is driving home. Esign not available.

## 2017-05-12 NOTE — DISCHARGE INSTRUCTIONS
Chest Pain: Care Instructions  Your Care Instructions  There are many things that can cause chest pain. Some are not serious and will get better on their own in a few days. But some kinds of chest pain need more testing and treatment. Your doctor may have recommended a follow-up visit in the next 8 to 12 hours. If you are not getting better, you may need more tests or treatment. Even though your doctor has released you, you still need to watch for any problems. The doctor carefully checked you, but sometimes problems can develop later. If you have new symptoms or if your symptoms do not get better, get medical care right away. If you have worse or different chest pain or pressure that lasts more than 5 minutes or you passed out (lost consciousness), call 911 or seek other emergency help right away. A medical visit is only one step in your treatment. Even if you feel better, you still need to do what your doctor recommends, such as going to all suggested follow-up appointments and taking medicines exactly as directed. This will help you recover and help prevent future problems. How can you care for yourself at home? · Rest until you feel better. · Take your medicine exactly as prescribed. Call your doctor if you think you are having a problem with your medicine. · Do not drive after taking a prescription pain medicine. When should you call for help? Call 911 if:  · You passed out (lost consciousness). · You have severe difficulty breathing. · You have symptoms of a heart attack. These may include:  ¨ Chest pain or pressure, or a strange feeling in your chest.  ¨ Sweating. ¨ Shortness of breath. ¨ Nausea or vomiting. ¨ Pain, pressure, or a strange feeling in your back, neck, jaw, or upper belly or in one or both shoulders or arms. ¨ Lightheadedness or sudden weakness. ¨ A fast or irregular heartbeat.   After you call 911, the  may tell you to chew 1 adult-strength or 2 to 4 low-dose aspirin. Wait for an ambulance. Do not try to drive yourself. Call your doctor today if:  · You have any trouble breathing. · Your chest pain gets worse. · You are dizzy or lightheaded, or you feel like you may faint. · You are not getting better as expected. · You are having new or different chest pain. Where can you learn more? Go to http://krista-apryl.info/. Enter A120 in the search box to learn more about \"Chest Pain: Care Instructions. \"  Current as of: May 27, 2016  Content Version: 11.2  © 5491-3348 [employer]. Care instructions adapted under license by [employer] (which disclaims liability or warranty for this information). If you have questions about a medical condition or this instruction, always ask your healthcare professional. [address] any warranty or liability for your use of this information. Urinary Tract Infection in Women: Care Instructions  Your Care Instructions    A urinary tract infection, or UTI, is a general term for an infection anywhere between the kidneys and the urethra (where urine comes out). Most UTIs are bladder infections. They often cause pain or burning when you urinate. UTIs are caused by bacteria and can be cured with antibiotics. Be sure to complete your treatment so that the infection goes away. Follow-up care is a key part of your treatment and safety. Be sure to make and go to all appointments, and call your doctor if you are having problems. It's also a good idea to know your test results and keep a list of the medicines you take. How can you care for yourself at home? · Take your antibiotics as directed. Do not stop taking them just because you feel better. You need to take the full course of antibiotics. · Drink extra water and other fluids for the next day or two. This may help wash out the bacteria that are causing the infection.  (If you have kidney, heart, or liver disease and have to limit fluids, talk with your doctor before you increase your fluid intake.)  · Avoid drinks that are carbonated or have caffeine. They can irritate the bladder. · Urinate often. Try to empty your bladder each time. · To relieve pain, take a hot bath or lay a heating pad set on low over your lower belly or genital area. Never go to sleep with a heating pad in place. To prevent UTIs  · Drink plenty of water each day. This helps you urinate often, which clears bacteria from your system. (If you have kidney, heart, or liver disease and have to limit fluids, talk with your doctor before you increase your fluid intake.)  · Urinate when you need to. · Urinate right after you have sex. · Change sanitary pads often. · Avoid douches, bubble baths, feminine hygiene sprays, and other feminine hygiene products that have deodorants. · After going to the bathroom, wipe from front to back. When should you call for help? Call your doctor now or seek immediate medical care if:  · Symptoms such as fever, chills, nausea, or vomiting get worse or appear for the first time. · You have new pain in your back just below your rib cage. This is called flank pain. · There is new blood or pus in your urine. · You have any problems with your antibiotic medicine. Watch closely for changes in your health, and be sure to contact your doctor if:  · You are not getting better after taking an antibiotic for 2 days. · Your symptoms go away but then come back. Where can you learn more? Go to http://krista-apryl.info/. Enter B592 in the search box to learn more about \"Urinary Tract Infection in Women: Care Instructions. \"  Current as of: November 28, 2016  Content Version: 11.2  © 2573-6386 [employer]. Care instructions adapted under license by [employer] (which disclaims liability or warranty for this information).  If you have questions about a medical condition or this instruction, always ask your healthcare professional. [place] [address] any warranty or liability for your use of this information.

## 2017-05-12 NOTE — ED TRIAGE NOTES
Pt arrives via [facility] complaining of chest wall pain, per EMS. Pain increases upon palpation and inspiration. Pt states the pain has been going on for the past few days, pt has a cardiac hx, hx of urinary tract infections. Pt temp was 97.8 oral, HR 70s, /96 en route. Pt is normally pleasantly confused. Pt states she can't tell if she's hurting right now.

## 2017-05-12 NOTE — ED PROVIDER NOTES
HPI Comments: 80-year-old female presents with daughter for intermittent episodes of chest pain. Patient states it happens \"all the time. \"  She states that it worsened last night. She is a poor historian. She says it has currently eased off. Pain is sharp and left-sided, worse with movement, breathing, and palpation. It occasionally radiates to the back. She denies cough, however daughter states that she has had a slight recent cough. No fevers. Patient reports leg swelling, but the daughter states is unchanged from baseline. Daughter also states she's been somewhat more confused of the past 2 days which is common with urinary tract infections. Patient is a 80 y.o. female presenting with chest pain. The history is provided by the patient and a relative. Chest Pain (Angina)    Associated symptoms include cough and shortness of breath. Pertinent negatives include no abdominal pain, no back pain, no fever, no headaches, no nausea, no palpitations, no vomiting and no weakness.         Past Medical History:   Diagnosis Date    Abdominal aneurysm without mention of rupture 09/29/09    Abdominal pain, epigastric 05/06/10    Abdominal pain, generalized 12/07/10    Acute renal failure ([facility] [address] 5/18/2011    Acute, but ill-defined, cerebrovascular disease 09/29/09    Aneurysm ([facility] [address]     present AAA - pt states to small to operate     Anxiety state, unspecified 12/06/13    Arrhythmia      [facility] cardiology ([place])    Arthritis     Atrial fibrillation ([facility] [address] 12/21/09    Backache, unspecified 08/26/10    CAD (coronary artery disease) 2000    CABG - MI in 2000    CHF - Chronic combined systolic \T\ diastolic 7/8/1157    OV: 77/07/32 7/12/12: EF 27%, dyskinesis inferior wall and apex - patient opted for no additional therapy, no invasive evaluation or treatment including device therapy, therefore echo has not been repeated mobile apical thrombus - Xarelto    Chronic pain     [facility] 03/22/10    Chronic UTI     CKD (chronic kidney disease), unspecified 7/6/2016    OV: 11/12/15 baseline Cr 1.7    Congestive heart failure, unspecified 10/28/09    Coronary atherosclerosis of unspecified type of vessel, native or graft 09/22/09    Dehydration 10/28/09    Dementia 11/27/2009    Depression     Depressive disorder, not elsewhere classified 07/26/10    Diarrhea 09/05/12    Diverticulitis of colon (without mention of hemorrhage) 12/29/09    Dysuria 12/21/09    Gastrointestinal disorder     diverticulitis    GERD (gastroesophageal reflux disease) 11/27/2009    controls with meds     Heart failure (HCC)     systolic, EF 22% in 8184    Hematuria, unspecified 03/10/10    Hemorrhage of gastrointestinal tract, unspecified 12/30/09    Hemorrhage of rectum and anus 12/29/09    HTN 09/22/09    controlled with meds     Hypotension due to drugs 7/6/2016    OV: 11/12/15 limits therapy    Hypotension, unspecified 10/13/09    Hypothyroidism     Insomnia, unspecified 12/06/13    Intestinal infection due to other organism, not elsewhere classified 03/22/10    Irritable bowel syndrome 05/12/10    Lumbago 09/09/10    Midline low back pain with sciatica     Mononeuritis of unspecified site 08/22/13    Muscle weakness (generalized) 10/13/09    Nonspecific abnormal results of liver function study 12/07/10    Nonspecific elevation of levels of transaminase or lactic acid dehydrogenase (LDH) 11/09/10    Other and unspecified hyperlipidemia 09/22/09    Other degenerative diseases of the basal ganglia 05/12/10    Other ill-defined conditions     hypercholesterolemia    Other nonspecific finding on examination of urine 02/17/10    Other persistent mental disorders due to conditions classified elsewhere 03/22/10    Other primary cardiomyopathies 11/04/09    Pain in limb 03/28/14    Panic attacks     Psychiatric disorder     anxiety/depression     Pyelonephritis, unspecified 5/17/2011  S/P Coronary Artery Bypass Graft 7/6/2016    OV: 11/12/15 HOSKINS to LAD, patent at cath 2005, no other significant obstructive disease.  Spinal stenosis, unspecified region other than cervical 01/04/13    Stroke [facility])     TIA - no residual weakness - 14 years ago     Unspecified adverse effect of anesthesia     pt states difficult to put to sleep     Unspecified hypothyroidism 10/28/09    Unspecified symptom associated with female genital organs 09/09/10    Unspecified urinary incontinence 02/17/10    Urosepsis 9/4/2014       Past Surgical History:   Procedure Laterality Date    CABG, ARTERIAL, THREE  2000    CARDIAC SURG PROCEDURE UNLIST      bypass    HX APPENDECTOMY      HX BLADDER SUSPENSION      HX CHOLECYSTECTOMY      HX COLONOSCOPY      HX GYN      hysterectomy     HX HEART CATHETERIZATION  4/26/94    HX HEENT      bilateral cataracts with lens implants     HX HERNIA REPAIR      umbilical     HX ORTHOPAEDIC      back x 2    HX TOTAL ABDOMINAL HYSTERECTOMY           Family History:   Problem Relation Age of Onset    Heart Disease Mother     No Known Problems Father        Social History     Social History    Marital status:      Spouse name: N/A    Number of children: N/A    Years of education: N/A     Occupational History    Not on file. Social History Main Topics    Smoking status: Former Smoker     Quit date: 12/5/1993    Smokeless tobacco: Never Used    Alcohol use No    Drug use: No    Sexual activity: No     Other Topics Concern    Not on file     Social History Narrative         ALLERGIES: Ivp dye [fd and c blue no.1]; Codeine; Iodine; Macrodantin [nitrofurantoin macrocrystalline]; Morphine; Povidone-iodine; and Sulfa (sulfonamide antibiotics)    Review of Systems   Constitutional: Negative for chills and fever. HENT: Negative for hearing loss. Eyes: Negative for visual disturbance. Respiratory: Positive for cough and shortness of breath. Cardiovascular: Positive for chest pain and leg swelling. Negative for palpitations. Gastrointestinal: Negative for abdominal pain, diarrhea, nausea and vomiting. Musculoskeletal: Negative for back pain. Skin: Negative for rash. Neurological: Negative for weakness and headaches. Psychiatric/Behavioral: Positive for confusion. Vitals:    05/12/17 1313   BP: (!) 153/97   Pulse: 68   Resp: 18   Temp: 98.5 °F (36.9 °C)   SpO2: 98%   Weight: 79.4 kg (175 lb)   Height: 5' 9\" (1.753 m)            Physical Exam   Constitutional: She appears well-developed and well-nourished. HENT:   Head: Normocephalic and atraumatic. Right Ear: External ear normal.   Left Ear: External ear normal.   Nose: Nose normal.   Mouth/Throat: Oropharynx is clear and moist.   Eyes: Conjunctivae are normal. Pupils are equal, round, and reactive to light. Neck: Normal range of motion. Neck supple. Cardiovascular: Regular rhythm, normal heart sounds and intact distal pulses. Pulmonary/Chest: Effort normal and breath sounds normal. No respiratory distress. She has no wheezes. She exhibits tenderness. She exhibits no crepitus. Abdominal: Soft. Bowel sounds are normal. She exhibits no distension. There is no tenderness. Musculoskeletal: Normal range of motion. She exhibits no edema. Neurological: She is alert. Skin: Skin is warm and dry. Psychiatric: Judgment normal.   Nursing note and vitals reviewed. MDM  Number of Diagnoses or Management Options  Diagnosis management comments: Parts of this document were created using dragon voice recognition software. The chart has been reviewed but errors may still be present. 4:49 PM  Repeat troponin normal.  No NEW ischemia on EKG. Unchanged T-wave inversions anterolateral leads. Treated for urinary tract infection. I discussed the results of all labs, procedures, radiographs, and treatments with the patient and available family.   Treatment plan is agreed upon and the patient is ready for discharge. Questions about treatment in the ED and differential diagnosis of presenting condition were answered. Patient was given verbal discharge instructions including, but not limited to, importance of returning to the emergency department for any concern of worsening or continued symptoms. Instructions were given to follow up with a primary care provider or specialist within 1-2 days. Adverse effects of medications, if prescribed, were discussed and patient was advised to refrain from significant physical activity until followed up by primary care physician and to not drive or operate heavy machinery after taking any sedating substances.            Amount and/or Complexity of Data Reviewed  Clinical lab tests: ordered and reviewed (Results for orders placed or performed during the hospital encounter of 05/12/17  -CBC WITH AUTOMATED DIFF       Result                                            Value                         Ref Range                       WBC                                               7.6                           4.3 - 11.1 K/uL                 RBC                                               4.93                          4.05 - 5.25 M/uL                HGB                                               14.0                          11.7 - 15.4 g/dL                HCT                                               42.5                          35.8 - 46.3 %                   MCV                                               86.2                          79.6 - 97.8 FL                  MCH                                               28.4                          26.1 - 32.9 PG                  MCHC                                              32.9                          31.4 - 35.0 g/dL                RDW                                               15.1 (H)                      11.9 - 14.6 %                   PLATELET 238                           150 - 450 K/uL                  MPV                                               10.2 (L)                      10.8 - 14.1 FL                  DF                                                AUTOMATED                                                     NEUTROPHILS                                       56                            43 - 78 %                       LYMPHOCYTES                                       36                            13 - 44 %                       MONOCYTES                                         6                             4.0 - 12.0 %                    EOSINOPHILS                                       2                             0.5 - 7.8 %                     BASOPHILS                                         0                             0.0 - 2.0 %                     IMMATURE GRANULOCYTES                             0.3                           0.0 - 5.0 %                     ABS. NEUTROPHILS                                  4.2                           1.7 - 8.2 K/UL                  ABS. LYMPHOCYTES                                  2.7                           0.5 - 4.6 K/UL                  ABS. MONOCYTES                                    0.4                           0.1 - 1.3 K/UL                  ABS. EOSINOPHILS                                  0.1                           0.0 - 0.8 K/UL                  ABS. BASOPHILS                                    0.0                           0.0 - 0.2 K/UL                  ABS. IMM.  GRANS.                                  0.0                           0.0 - 0.5 K/UL             -METABOLIC PANEL, COMPREHENSIVE       Result                                            Value                         Ref Range                       Sodium                                            145                           136 - 145 mmol/L                Potassium                                         4.0 3.5 - 5.1 mmol/L                Chloride                                          108 (H)                       98 - 107 mmol/L                 CO2                                               30                            21 - 32 mmol/L                  Anion gap                                         7                             7 - 16 mmol/L                   Glucose                                           83                            65 - 100 mg/dL                  BUN                                               20                            8 - 23 MG/DL                    Creatinine                                        0.94                          0.6 - 1.0 MG/DL                 GFR est AA                                        >60                           >60 ml/min/1.73m2               GFR est non-AA                                    >60                           >60 ml/min/1.73m2               Calcium                                           9.2                           8.3 - 10.4 MG/DL                Bilirubin, total                                  0.5                           0.2 - 1.1 MG/DL                 ALT (SGPT)                                        22                            12 - 65 U/L                     AST (SGOT)                                        27                            15 - 37 U/L                     Alk. phosphatase                                  105                           50 - 136 U/L                    Protein, total                                    7.9                           6.3 - 8.2 g/dL                  Albumin                                           3.4                           3.2 - 4.6 g/dL                  Globulin                                          4.5 (H)                       2.3 - 3.5 g/dL                  A-G Ratio                                         0.8 (L)                       1.2 - 3.5 -TROPONIN I       Result                                            Value                         Ref Range                       Troponin-I, Qt.                                   <0.02 (L)                     0.02 - 0.05 NG/ML          -BNP       Result                                            Value                         Ref Range                       BNP                                               202                           pg/mL                      -URINALYSIS W/ RFLX MICROSCOPIC       Result                                            Value                         Ref Range                       Color                                             YELLOW                                                        Appearance                                        TURBID                                                        Specific gravity                                  1.015                         1.001 - 1.023                   pH (UA)                                           7.0                           5.0 - 9.0                       Protein                                           NEGATIVE                      NEG mg/dL                       Glucose                                           NEGATIVE                      NEG mg/dL                       Ketone                                            NEGATIVE                      NEG mg/dL                       Bilirubin                                         NEGATIVE                      NEG                             Blood                                             NEGATIVE                      NEG                             Urobilinogen                                      0.2                           0.2 - 1.0 EU/dL                 Nitrites                                          POSITIVE (A)                  NEG                             Leukocyte Esterase                                SMALL (A)                     NEG Bacteria                                          0                             0 /hpf                     -POC TROPONIN-I       Result                                            Value                         Ref Range                       Troponin-I (POC)                                  0                             0.0 - 0.08 ng/ml           -POC TROPONIN-I       Result                                            Value                         Ref Range                       Troponin-I (POC)                                  0.01                          0.0 - 0.08 ng/ml           -EKG, 12 LEAD, INITIAL       Result                                            Value                         Ref Range                       Ventricular Rate                                  69                            BPM                             Atrial Rate                                       69                            BPM                             P-R Interval                                      190                           ms                              QRS Duration                                      98                            ms                              Q-T Interval                                      428                           ms                              QTC Calculation (Bezet)                           458                           ms                              Calculated P Axis                                 48                            degrees                         Calculated R Axis                                 -2                            degrees                         Calculated T Axis                                 138                           degrees                         Diagnosis                                                                                                   !! AGE AND GENDER SPECIFIC ECG ANALYSIS !!    Normal sinus rhythm   Septal infarct (cited on or before 07-SEP-2000)   T wave abnormality, consider anterolateral ischemia   Abnormal ECG   When compared with ECG of 27-MAR-2017 12:28,   No significant change was found     )  Tests in the radiology section of CPT®: ordered and reviewed (Xr Chest Port    Result Date: 5/12/2017  History: Chest pain, moderate in severity with shortness of breath. Exam: portable chest Comparison: 3/27/2017 Findings: Stable postsurgical change noted. No focal alveolar infiltrate or pleural effusion. No change in the appearance of the mediastinal contour or osseous structures. Impressions: No acute abnormality.  Stable exam.     )  Tests in the medicine section of CPT®: ordered and reviewed      ED Course       Procedures

## 2017-05-13 NOTE — PROGRESS NOTES
Initial outreach attempt to patient was unsuccessful. Second outreach attempt will be made within 24 hours. This note will not be viewable in [address]

## 2017-05-14 NOTE — PROGRESS NOTES
This note will not be viewable in [address] Transition of Care Discharge Follow-up Questionnaire   Date/Time of Call:   5/13/17 3:58p   What was the patient hospitalized for? Chest pain, HTN   Does the patient understand his/her diagnosis and/or treatment and what happened during the hospitalization? Patients daughter understands the diagnosis and treatment during hospitalization. Did the patient receive discharge instructions? Yes   Review any discharge instructions (see notes in [facility]). Ask patient if they understand these. Do they have any questions? She does not have any questions in regards to instructions. Were home services ordered (nursing, PT, OT, ST, etc.)? No     If so, has the first visit occurred? If not, why? (Assist with coordination of services if necessary.) n/a       Was any DME ordered? No   If so, has it been received? If not, why?  (Assist with coordination of arranging DME orders if necessary.) n/a   Complete a review of all medications (new, continued and discontinued meds per the D/C instructions and medication tab in [facility]). Patient and care coordinator reviewed current medications. Patient was not able to take medications yesterday.    Daughter has concerns about ABT therapy and recurrent UTIs. She does not feel they are working or effective but will give antibiotic time to work.   Dtr will address issue with PCP if symptoms worsen. Were all new prescriptions filled? If not, why?  (Assist with obtainment of medications if necessary.) Yes   Does the patient understand the purpose and dosing instructions for all medications? (If patient has questions, provide explanation and education.) Yes     Does the patient have any problems in performing ADLs? (If patient is unable to perform ADLs  what is the limiting factor(s)?   Do they have a support system that can assist? If no support system is present, discuss possible assistance that they may be able to obtain.) Patient Jocelyn Jackson been doing too good.  Patient stays with Dtr on the weekend. Dtr assists with ADLs. Patient wears diapers and has a full-time sitter during the week. Does the patient have all follow-up appointments scheduled? Has transportation been arranged? [facility] follow-up should be within 7 days of discharge; all others should have PCP follow-up within 7 days of discharge; follow-ups with other specialists as appropriate or ordered.) Dtr will call and schedule f/u appointment with Dr. Gregory Spurling. She is currently looking for another Urologist.        Any other questions or concerns expressed by the patient? No other questions or concerns were expressed to care coordinator. Patient currently engaged with RN JULIO.      LESIA Call Completed By: MARGARITA Rose ACO  Care Coordinator

## 2017-05-15 NOTE — PROGRESS NOTES
Received call from patient's daughter. Patient did receive her wheelchair. DME provider to sent seat cushion in 2 weeks. Daughter requested CCM notify DME provider regarding supplemental insurane. CCM provided supplemental information to David at [employer]. Patient was at ER 5/12/17 and diagnosed with UTI. Keflex 500 mg ordered four times daily for 7 days. Daughter reports medication obtained and being taken as ordered. Daughter to call PCP today to  schedule f/u appointment. This note will not be viewable in [address]

## 2017-05-17 NOTE — PROGRESS NOTES
CCM follow up call. Unable to reach patient. Voicemail not set up. Mailbox full on cell phone. Chart review shows patient has an appointment with PCP today. This note will not be viewable in [address]

## 2017-05-22 NOTE — PROGRESS NOTES
CCM follow up call. Patient's daughter reports patient did follow up with PCP on 5/17/17 regarding ER visit for UTI. Patient received referral to Urologist for recurrent UTI's. Daughter to notify Dr. Adilia Ortiz office to schedule appointment. Patient continues to take her antibiotic as prescribed. Daughter to notify CCM with any issues/concerns. This note will not be viewable in [address]

## 2017-05-29 NOTE — PROGRESS NOTES
CCM follow up call. Unable to reach patient's daughter or leave message mailbox full. This note will not be viewable in [address]

## 2017-06-01 NOTE — PROGRESS NOTES
CCM follow up call. Unable to reach patient's daughter. Left message with contact information requesting a return call. This note will not be viewable in [address]

## 2017-06-14 NOTE — ED NOTES
I have reviewed discharge instructions with the patient. Patient verbalizes understanding. Opportunity for questions provided. Prescriptions in hand. Patient off the unit with [facility] ambulance service. No distress noted at this time.

## 2017-06-14 NOTE — ED TRIAGE NOTES
Per EMS Pt's caretaker noticed slurred speech and face droop around 12:30. Recently diagnosed with bells palsy. .   /57

## 2017-06-14 NOTE — ED PROVIDER NOTES
HPI Comments: Patient was brought to [facility] from home after home health care provider noted slurring of speech and facial droop. This may or may not of the new to the healthcare provider but appears to be already evaluated in by her primary care provider 2 days ago. Family states that her symptoms were first noted 3 days ago and they decided to wait until being seen by the primary care physician 2 days ago as it already had a scheduled appointment evaluation that time demonstrates normal blood work except for an elevated TSH and a CT was done which was nondiagnostic but no acute process was identified. Review of the practitioner's note from the office visit indicates suspicion for a small CVA however the patient is already on Xarelto. There is a follow-up appointment scheduled for June 26. Family member reports that the patient has had several episodes of diarrhea this morning and was unable to stand with assistance due to weakness. The patient does have a history of dementia and normally requires assistance to ambulate. Patient is a 80 y.o. female presenting with context. The history is provided by the patient. Dysarthria   This is a new problem. The current episode started more than 2 days ago. The problem has not changed since onset. There was right facial focality noted. Primary symptoms include slurred speech. Pertinent negatives include no focal weakness, no loss of balance, no speech difficulty, no memory loss, no movement disorder, no agitation, no visual change, no auditory change, no mental status change, no unresponsiveness and no disorientation. There has been no fever. Pertinent negatives include no shortness of breath, no chest pain, no vomiting, no altered mental status, no confusion, no headaches, no choking, no nausea, no bowel incontinence and no bladder incontinence.         Past Medical History:   Diagnosis Date    Abdominal aneurysm without mention of rupture 09/29/09    Abdominal pain, epigastric 05/06/10    Abdominal pain, generalized 12/07/10    Acute renal failure ([facility] [address] 5/18/2011    Acute, but ill-defined, cerebrovascular disease 09/29/09    Aneurysm ([facility] [address]     present AAA - pt states to small to operate     Anxiety state, unspecified 12/06/13    Arrhythmia      [facility]    Arthritis     Atrial fibrillation ([facility] [address] 12/21/09    Backache, unspecified 08/26/10    CAD (coronary artery disease) 2000    CABG - MI in 2000    CHF - Chronic combined systolic \T\ diastolic 8/8/2735    OV: 26/67/90 7/12/12: EF 27%, dyskinesis inferior wall and apex - patient opted for no additional therapy, no invasive evaluation or treatment including device therapy, therefore echo has not been repeated mobile apical thrombus - Xarelto    Chronic pain     Chronic systolic heart failure (HCC) 03/22/10    Chronic UTI     CKD (chronic kidney disease), unspecified 7/6/2016    OV: 11/12/15 baseline Cr 1.7    Congestive heart failure, unspecified 10/28/09    Coronary atherosclerosis of unspecified type of vessel, native or graft 09/22/09    Dehydration 10/28/09    Dementia 11/27/2009    Depression     Depressive disorder, not elsewhere classified 07/26/10    Diarrhea 09/05/12    Diverticulitis of colon (without mention of hemorrhage) 12/29/09    Dysuria 12/21/09    Gastrointestinal disorder     diverticulitis    GERD (gastroesophageal reflux disease) 11/27/2009    controls with meds     Heart failure (HCC)     systolic, EF 47% in 7988    Hematuria, unspecified 03/10/10    Hemorrhage of gastrointestinal tract, unspecified 12/30/09    Hemorrhage of rectum and anus 12/29/09    HTN 09/22/09    controlled with meds     Hypotension due to drugs 7/6/2016    OV: 11/12/15 limits therapy    Hypotension, unspecified 10/13/09    Hypothyroidism     Insomnia, unspecified 12/06/13    Intestinal infection due to other organism, not elsewhere classified 03/22/10    Irritable bowel syndrome 05/12/10    Lumbago 09/09/10    Midline low back pain with sciatica     Mononeuritis of unspecified site 08/22/13    Muscle weakness (generalized) 10/13/09    Nonspecific abnormal results of liver function study 12/07/10    Nonspecific elevation of levels of transaminase or lactic acid dehydrogenase (LDH) 11/09/10    Other and unspecified hyperlipidemia 09/22/09    Other degenerative diseases of the basal ganglia 05/12/10    Other ill-defined conditions     hypercholesterolemia    Other nonspecific finding on examination of urine 02/17/10    Other persistent mental disorders due to conditions classified elsewhere 03/22/10    Other primary cardiomyopathies 11/04/09    Pain in limb 03/28/14    Panic attacks     Psychiatric disorder     anxiety/depression     Pyelonephritis, unspecified 5/17/2011    S/P Coronary Artery Bypass Graft 7/6/2016    OV: 11/12/15 HOSKINS to LAD, patent at cath 2005, no other significant obstructive disease.     Spinal stenosis, unspecified region other than cervical 01/04/13    Stroke [facility])     TIA - no residual weakness - 14 years ago     Unspecified adverse effect of anesthesia     pt states difficult to put to sleep     Unspecified hypothyroidism 10/28/09    Unspecified symptom associated with female genital organs 09/09/10    Unspecified urinary incontinence 02/17/10    Urosepsis 9/4/2014       Past Surgical History:   Procedure Laterality Date    CABG, ARTERIAL, THREE  2000    CARDIAC SURG PROCEDURE UNLIST      bypass    HX APPENDECTOMY      HX BLADDER SUSPENSION      HX CHOLECYSTECTOMY      HX COLONOSCOPY      HX GYN      hysterectomy     HX HEART CATHETERIZATION  4/26/94    HX HEENT      bilateral cataracts with lens implants     HX HERNIA REPAIR      umbilical     HX ORTHOPAEDIC      back x 2    HX TOTAL ABDOMINAL HYSTERECTOMY           Family History:   Problem Relation Age of Onset    Heart Disease Mother     No Known Problems Father Social History     Social History    Marital status:      Spouse name: N/A    Number of children: N/A    Years of education: N/A     Occupational History    Not on file. Social History Main Topics    Smoking status: Former Smoker     Quit date: 12/5/1993    Smokeless tobacco: Never Used    Alcohol use No    Drug use: No    Sexual activity: No     Other Topics Concern    Not on file     Social History Narrative         ALLERGIES: Ivp dye [fd and c blue no.1]; Codeine; Iodine; Macrodantin [nitrofurantoin macrocrystalline]; Morphine; Povidone-iodine; and Sulfa (sulfonamide antibiotics)    Review of Systems   Constitutional: Negative for chills and fever. Respiratory: Negative for choking and shortness of breath. Cardiovascular: Negative for chest pain. Gastrointestinal: Positive for diarrhea. Negative for bowel incontinence, nausea and vomiting. Genitourinary: Negative for bladder incontinence. Neurological: Negative for focal weakness, speech difficulty, headaches and loss of balance. Psychiatric/Behavioral: Negative for agitation, confusion and memory loss. All other systems reviewed and are negative. Vitals:    06/14/17 1509   BP: 124/58   Pulse: 62   Resp: 20   Temp: 97.4 °F (36.3 °C)   SpO2: 97%   Weight: 79.4 kg (175 lb)   Height: 5' 5\" (1.651 m)            Physical Exam   Constitutional: She appears well-developed and well-nourished. No distress. HENT:   Head: Normocephalic and atraumatic. Mouth/Throat: No oropharyngeal exudate. Very dry mucous membranes are noted in the oral cavity   Eyes: Conjunctivae and EOM are normal. Pupils are equal, round, and reactive to light. Neck: Normal range of motion. Neck supple. Cardiovascular: Normal rate, regular rhythm and normal heart sounds. Pulmonary/Chest: Effort normal and breath sounds normal.   Abdominal: Soft. Bowel sounds are normal.   Musculoskeletal: Normal range of motion. She exhibits no edema. Neurological: She is alert. She displays normal reflexes. She exhibits abnormal muscle tone. Patient is disoriented to time secondary to her dementia there is some mild right facial droop noted positive gag reflex is noted the tongue protrudes in midline there is no deficit of the seventh cranial nerve noted. The patient appears to have good strength in the lower extremities while lying on the cart although she cannot elevate the legs off the bed. Skin: Skin is warm and dry. Psychiatric: She has a normal mood and affect. Her behavior is normal.   Nursing note and vitals reviewed. MDM  Number of Diagnoses or Management Options     Amount and/or Complexity of Data Reviewed  Clinical lab tests: ordered and reviewed  Tests in the radiology section of CPT®: ordered and reviewed  Discuss the patient with other providers: yes (Primary care provider)    Risk of Complications, Morbidity, and/or Mortality  Presenting problems: high  Diagnostic procedures: moderate  Management options: moderate  General comments: Patient will be hydrated due to the noted change in creatinine. Case was discussed with the memory care provider for follow-up.   As the patient is already on an anticoagulant that is felt that there would be no benefit to admission to the hospital at this time    Patient Progress  Patient progress: stable    ED Course       Procedures

## 2017-06-14 NOTE — DISCHARGE INSTRUCTIONS
Dehydration: Care Instructions  Your Care Instructions  Dehydration happens when your body loses too much fluid. This might happen when you do not drink enough water or you lose large amounts of fluids from your body because of diarrhea, vomiting, or sweating. Severe dehydration can be life-threatening. Water and minerals called electrolytes help put your body fluids back in balance. Learn the early signs of fluid loss, and drink more fluids to prevent dehydration. Follow-up care is a key part of your treatment and safety. Be sure to make and go to all appointments, and call your doctor if you are having problems. It's also a good idea to know your test results and keep a list of the medicines you take. How can you care for yourself at home? · To prevent dehydration, drink plenty of fluids, enough so that your urine is light yellow or clear like water. Choose water and other caffeine-free clear liquids until you feel better. If you have kidney, heart, or liver disease and have to limit fluids, talk with your doctor before you increase the amount of fluids you drink. · If you do not feel like eating or drinking, try taking small sips of water, sports drinks, or other rehydration drinks. · Get plenty of rest.  To prevent dehydration  · Add more fluids to your diet and daily routine, unless your doctor has told you not to. · During hot weather, drink more fluids. Drink even more fluids if you exercise a lot. Stay away from drinks with alcohol or caffeine. · Watch for the symptoms of dehydration. These include:  ¨ A dry, sticky mouth. ¨ Dark yellow urine, and not much of it. ¨ Dry and sunken eyes. ¨ Feeling very tired. · Learn what problems can lead to dehydration. These include:  ¨ Diarrhea, fever, and vomiting. ¨ Any illness with a fever, such as pneumonia or the flu. ¨ Activities that cause heavy sweating, such as endurance races and heavy outdoor work in hot or humid weather.   ¨ Alcohol or drug abuse or withdrawal.  ¨ Certain medicines, such as cold and allergy pills (antihistamines), diet pills (diuretics), and laxatives. ¨ Certain diseases, such as diabetes, cancer, and heart or kidney disease. When should you call for help? Call 911 anytime you think you may need emergency care. For example, call if:  · You passed out (lost consciousness). Call your doctor now or seek immediate medical care if:  · You are confused and cannot think clearly. · You are dizzy or lightheaded, or you feel like you may faint. · You have signs of needing more fluids. You have sunken eyes and a dry mouth, and you pass only a little dark urine. · You cannot keep fluids down. Watch closely for changes in your health, and be sure to contact your doctor if:  · You are not making tears. · Your skin is very dry and sags slowly back into place after you pinch it. · Your mouth and eyes are very dry. Where can you learn more? Go to http://krista-apryl.info/. Enter T791 in the search box to learn more about \"Dehydration: Care Instructions. \"  Current as of: May 27, 2016  Content Version: 11.2  © 4819-8031 [employer]. Care instructions adapted under license by [employer] (which disclaims liability or warranty for this information). If you have questions about a medical condition or this instruction, always ask your healthcare professional. [place] [address] any warranty or liability for your use of this information. Urinary Tract Infection in Women: Care Instructions  Your Care Instructions    A urinary tract infection, or UTI, is a general term for an infection anywhere between the kidneys and the urethra (where urine comes out). Most UTIs are bladder infections. They often cause pain or burning when you urinate. UTIs are caused by bacteria and can be cured with antibiotics. Be sure to complete your treatment so that the infection goes away.   Follow-up care is a key part of your treatment and safety. Be sure to make and go to all appointments, and call your doctor if you are having problems. It's also a good idea to know your test results and keep a list of the medicines you take. How can you care for yourself at home? · Take your antibiotics as directed. Do not stop taking them just because you feel better. You need to take the full course of antibiotics. · Drink extra water and other fluids for the next day or two. This may help wash out the bacteria that are causing the infection. (If you have kidney, heart, or liver disease and have to limit fluids, talk with your doctor before you increase your fluid intake.)  · Avoid drinks that are carbonated or have caffeine. They can irritate the bladder. · Urinate often. Try to empty your bladder each time. · To relieve pain, take a hot bath or lay a heating pad set on low over your lower belly or genital area. Never go to sleep with a heating pad in place. To prevent UTIs  · Drink plenty of water each day. This helps you urinate often, which clears bacteria from your system. (If you have kidney, heart, or liver disease and have to limit fluids, talk with your doctor before you increase your fluid intake.)  · Urinate when you need to. · Urinate right after you have sex. · Change sanitary pads often. · Avoid douches, bubble baths, feminine hygiene sprays, and other feminine hygiene products that have deodorants. · After going to the bathroom, wipe from front to back. When should you call for help? Call your doctor now or seek immediate medical care if:  · Symptoms such as fever, chills, nausea, or vomiting get worse or appear for the first time. · You have new pain in your back just below your rib cage. This is called flank pain. · There is new blood or pus in your urine. · You have any problems with your antibiotic medicine.   Watch closely for changes in your health, and be sure to contact your doctor if:  · You are not getting better after taking an antibiotic for 2 days. · Your symptoms go away but then come back. Where can you learn more? Go to http://krista-apryl.info/. Enter B555 in the search box to learn more about \"Urinary Tract Infection in Women: Care Instructions. \"  Current as of: November 28, 2016  Content Version: 11.2  © 0213-9368 [employer]. Care instructions adapted under license by [employer] (which disclaims liability or warranty for this information). If you have questions about a medical condition or this instruction, always ask your healthcare professional. [place] [address] any warranty or liability for your use of this information.

## 2017-06-15 NOTE — PROGRESS NOTES
ED Transition of Care Discharge Follow-up Questionnaire   Date/Time of Call:   6/15/17  12:30pm   What was the patient seen in the ED for? Dysarthria, Dehydration, UTI   Does the patient understand his/her diagnosis and/or treatment and what happened during the ED visit? Yes patients daughter verbalized understanding diagnosis and treatments during ED visit. Did the patient receive discharge instructions from the ED? Yes   Review any discharge instructions (see notes in [facility]). Ask patient if they understand these. Do they have any questions? Patients daughter verbalized understanding of discharge instructions. Were home services ordered (nursing, PT, OT, ST, etc.)? No   If so, has the first visit occurred? If not, why? (Assist with coordination of services if necessary.) N/A   Was any DME ordered? No     If so, has it been received? If not, why?  (Assist with coordination of arranging DME orders if necessary.) N/A         Complete a review of all medications (new, continued and discontinued meds per the D/C instructions and medication tab in [facility]). Yes  Levaquin 500 mg daily x 5 days  Diflucan 150 mg prn signs/symptoms yeast infection/   Were all new prescriptions filled? If not, why?  (Assist with obtainment of medications if necessary.) Yes   Does the patient understand the purpose and dosing instructions for all medications? (If patient has questions, provide explanation and education.) Patients daughter verbalized understanding all current medication purposes and dosing. Does the patient have any problems in performing ADLs? (If patient is unable to perform ADLs  what is the limiting factor(s)? Do they have a support system that can assist? If no support system is present, discuss possible assistance that they may be able to obtain.) Patient has a home aide to assist with ADLs while daughter is at her job.    Does the patient have all follow-up appointments scheduled? Has transportation been arranged? [facility] follow-up should be within 7 days of discharge; all others should have PCP follow-up within 7 days of discharge; follow-ups with other specialists as appropriate or ordered.) PCP-6/26/17  Urology referral submitted by PCP. Daughter reports she has not heard from [facility] regarding appointment. Daughter to call [facility] today to verify referral receipt. She will contact CCM if further assistance is needed. Any other questions or concerns expressed by the patient? Daughter states [address] [employer] has not forwarded patients wheelchair seat cushion. States they should have received it 2 weeks ago. CCM contacted [address] Left message for Drea to return call regarding status of seat cushion. CCM will follow up. Schedule next appointment with [facility] Coordinator as appropriate or refer to RN Case Manager/  as appropriate. RN CCM will follow patient to address issues/concerns. LESIA Call Completed By: Isael Acosta RN         This note will not be viewable in [address]

## 2017-06-16 NOTE — PROGRESS NOTES
CCM follow up call to inform patient's daughter of information regarding wheelchair seat cushion. CCM spoke with Drea at [facility]. Seat cushion has been on back order. She has now received the cushion and will forward to patient via [id number] [employer]. Unable to reach patient's daughter to inform of the information but CCM spoke with patient's home aide. Home aide reports patient's daughter was unable to reach Urologist. CCM notified [facility] to verify receipt of referral. Prisca James in referrals verified receipt. Appointment scheduled for June 27, 2017 with NP initially so that patient can be seen at least 48 hours after completion of antibiotic. CCM communicated the information regarding appointment with home aide. Home Aide to inform patient's daughter when she calls her today. [facility] instructed home aide to notify [facility], request to speak with Prisca James in referrals if there is a conflict with appointment date and time. Home aide verbalized understanding and will have patient's daughter notify CCM with any concerns. This note will not be viewable in [address]

## 2017-06-29 NOTE — PROGRESS NOTES
CCM follow up call. Patient received wheelchair cushion. Attended appointment with Urologist 6/28/17. Cephalexin 250 mg daily ordered for recurrent UTI's and to return in 3 months or sooner if needed. Daughter to notify CCM with any future issues/concerns. Episode resolvedThis note will not be viewable in MyChart.

## 2017-07-19 NOTE — PROGRESS NOTES
Macario Gotham Bobbi  : 5/15/1931 [facility]  [address], [facility], [address]  [phone]   [phone]          OUTPATIENT PHYSICAL THERAPY:Initial Assessment 2017    ICD-10: Treatment Diagnosis: R26.2  Difficulty walking  M62.81  Generalized muscle weakness  Precautions/Allergies:   Ivp dye [fd and c blue no.1]; Codeine; Iodine; Macrodantin [nitrofurantoin macrocrystalline]; Morphine; Povidone-iodine; and Sulfa (sulfonamide antibiotics)   Fall Risk Score: 4 (? 5 = High Risk)  MD Orders: eval and treat MEDICAL/REFERRING DIAGNOSIS:  Unsteady gait [R26.81]   DATE OF ONSET: chronic, and gradual onset  REFERRING PHYSICIAN: Alicia Cruz NP  RETURN PHYSICIAN APPOINTMENT: TBD     INITIAL ASSESSMENT:  Ms. Ruben Alejo presents in wheelchair, with sitter. She is not able to give any accurate history, or to communicate in any really meaningful way. She has apparently had a significant, recent? decline in physical ability/mobility. According to chart, medical history is extensive; referral is for mobility, gait and balance exercise. At time of eval pt was at max to mod asst level for transfers, mat mobility, and sit to stand. Did not attempt ambulation with walker; pt reports pt can amb with walker and min to mod asst about 20'. PT will focus on repetitive mobility activities, standing balance and gait activities, to try to improve overall ability to move about, and to lessen the care burden on others. PROBLEM LIST (Impacting functional limitations):  1. Decreased Strength  2. Decreased ADL/Functional Activities  3. Decreased Transfer Abilities  4. Decreased Ambulation Ability/Technique  5. Decreased Balance  6. Decreased Activity Tolerance  7. Decreased Flexibility/Joint Mobility  8. Edema/Girth INTERVENTIONS PLANNED:  1. Balance Exercise  2. Bed Mobility  3. Gait Training  4.  Home Exercise Program (HEP)  5. Range of Motion (ROM)  6. Therapeutic Exercise/Strengthening   TREATMENT PLAN:  Effective Dates: 7- TO 10-2-2017. Frequency/Duration: 1 time a week for 8 weeks, and upon reassessment will adjust frequency and duration as progress indicates. GOALS: (Goals have been discussed and agreed upon with patient.)  Short-Term Functional Goals: Time Frame: 4 weeks  1. Transfers to/from wheelchair with min asst  2. Good dynamic sitting balance  3. Able to stand with single arm support for > 3 min, for asst with ADL activities. Discharge Goals: Time Frame: 8 weeks  1. Mat mobility with min to contact guard asst  2. Pt able to amb with rolling walker > 25' with min to contact guard asst  Rehabilitation Potential For Stated Goals: [address] [place] therapy, I certify that the treatment plan above will be carried out by a therapist or under their direction. Thank you for this referral,  Candance Pond, PT     Referring Physician Signature: Tiffany Vanegas NP              Date                    The information in this section was collected on 7- (except where otherwise noted). HISTORY:   History of Present Injury/Illness (Reason for Referral): Pt presents in wheelchair, with sitter assisting. She has very apparent cognitive deficits, and cannot relate her history, cannot complete sentences or thoughts. Her medical history, as noted below, is extensive, and includes many systemic problems, including CVA in 2009, chronic kidney failure, CAD, CHF ( has some swelling and discoloration in B feet);  S/p ?R hip fx with steffanie, CABG, back surgery, diabetes and neuropathy. At time of eval she required max asst to transfer from wheelchair to mat, and demonstrated poor siting balance at EOM. She is referred to help with gait and balance. Past Medical History/Comorbidities:   Ms. Shin Berrios  has a past medical history of Abdominal aneurysm without mention of rupture (09/29/09);  Abdominal pain, epigastric (05/06/10); Abdominal pain, generalized (12/07/10); Acute renal failure ([facility] (5/18/2011); Acute, but ill-defined, cerebrovascular disease (09/29/09); Aneurysm ([facility] Anxiety state, unspecified (12/06/13); Arrhythmia; Arthritis; Atrial fibrillation ([facility] (12/21/09); Backache, unspecified (08/26/10); CAD (coronary artery disease) (2000); CHF - Chronic combined systolic \T\ diastolic (4/5/7772); Chronic pain; Chronic systolic heart failure ([facility] (03/22/10); Chronic UTI; CKD (chronic kidney disease), unspecified (7/6/2016); Congestive heart failure, unspecified (10/28/09); Coronary atherosclerosis of unspecified type of vessel, native or graft (09/22/09); Dehydration (10/28/09); Dementia (11/27/2009); Depression; Depressive disorder, not elsewhere classified (07/26/10); Diabetes ([facility] Diarrhea (09/05/12); Diverticulitis of colon (without mention of hemorrhage) (12/29/09); Dysuria (12/21/09); Gastrointestinal disorder; GERD (gastroesophageal reflux disease) (11/27/2009); Heart failure ([facility] Hematuria, unspecified (03/10/10); Hemorrhage of gastrointestinal tract, unspecified (12/30/09); Hemorrhage of rectum and anus (12/29/09); HTN (09/22/09); Hypotension due to drugs (7/6/2016); Hypotension, unspecified (10/13/09); Hypothyroidism; Insomnia, unspecified (12/06/13); Intestinal infection due to other organism, not elsewhere classified (03/22/10); Irritable bowel syndrome (05/12/10); Lumbago (09/09/10); Midline low back pain with sciatica; Mononeuritis of unspecified site (08/22/13); Muscle weakness (generalized) (10/13/09); Nonspecific abnormal results of liver function study (12/07/10); Nonspecific elevation of levels of transaminase or lactic acid dehydrogenase (LDH) (11/09/10); Other and unspecified hyperlipidemia (09/22/09); Other degenerative diseases of the basal ganglia (05/12/10); Other ill-defined conditions; Other nonspecific finding on examination of urine (02/17/10);  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 She also has no past medical history of Autoimmune disease ([facility] Difficult intubation; Liver disease; Pseudocholinesterase deficiency; PUD (peptic ulcer disease); Seizures ([facility] Thromboembolus ([facility] or Unspecified sleep apnea. Ms. Jillian Jansen  has a past surgical history that includes cabg, arterial, three (2000); total abdominal hysterectomy; bladder suspension; cardiac surg procedure unlist; appendectomy; cholecystectomy; orthopaedic; hernia repair; gyn; heent; colonoscopy; and heart catheterization (4/26/94). Social History/Living Environment:     . Lives at home, with family support and sitters. Prior Level of Function/Work/Activity:  Long ago retired. Did  work. Dominant Side:         RIGHTPersonal Factors:          Age:  80 y.o. Current Medications:       Current Outpatient Prescriptions:     cephALEXin (KEFLEX) 250 mg capsule, Take 1 Cap by mouth daily. , Disp: 30 Cap, Rfl: 2    sertraline (ZOLOFT) 100 mg tablet, Take 1 Tab by mouth daily. , Disp: 90 Tab, Rfl: 4    levothyroxine (SYNTHROID) 112 mcg tablet, Take 1 Tab by mouth Daily (before breakfast). , Disp: 90 Tab, Rfl: 4    HYDROcodone-acetaminophen (NORCO) 5-325 mg per tablet, Take 1 Tab by mouth every four (4) hours as needed for Pain.  Max Daily Amount: 6 Tabs., Disp: 120 Tab, Rfl: 0    memantine (NAMENDA) 10 mg tablet, Take 1 Tab by mouth two (2) times a day., Disp: 180 Tab, Rfl: 3    amitriptyline (ELAVIL) 25 mg tablet, Take 1 Tab by mouth nightly., Disp: 90 Tab, Rfl: 4    rosuvastatin (CRESTOR) 10 mg tablet, Take 1 Tab by mouth nightly., Disp: 90 Tab, Rfl: 3    LORazepam (ATIVAN) 1 mg tablet, Take 1 Tab by mouth nightly as needed for Anxiety. Max Daily Amount: 1 mg., Disp: 30 Tab, Rfl: 5    diphenoxylate-atropine (LOMOTIL) 2.5-0.025 mg per tablet, Take 1 Tab by mouth four (4) times daily as needed for Diarrhea (1 tab after each stool for max 8 per day). Max Daily Amount: 4 Tabs. Take after each stool for a maximum of 8 tablets daily, Disp: 20 Tab, Rfl: 0    carvedilol (COREG) 6.25 mg tablet, Take 1 Tab by mouth two (2) times daily (with meals). , Disp: 180 Tab, Rfl: 3    raNITIdine (ZANTAC) 300 mg tablet, Take 1 Tab by mouth nightly., Disp: 90 Tab, Rfl: 3    rivaroxaban (XARELTO) 15 mg tab tablet, Take 1 Tab by mouth daily (with breakfast). , Disp: 90 Tab, Rfl: 3    ALPRAZolam (XANAX) 0.25 mg tablet, Take 0.25 mg by mouth daily as needed. , Disp: , Rfl:    Date Last Reviewed:  7-   Number of Personal Factors/Comorbidities that affect the Plan of Care: 3+: HIGH COMPLEXITY   EXAMINATION:   Observation/Orthostatic Postural Assessment:          Pt presents in wheelchair. Both feet have moderate edema around the ankles, and show some purple-jeremias discoloration. ROM:          Active bilat UE shoulder range is limited to about shoulder height. Can put hands on head, but not overhead. LEs have functional passive range. R hip shows very tight adductors and internal rotation. Difficult for her to get the leg to neutral.   Strength:          Generally 3 to 3+ throughout. Poorly coordinated UE movement. Cannot use UEs effectively to propel wc. Functional Mobility:         Gait/Ambulation:  Did not attempt ambulation at initial assessment. Max asst for sit to stand. Was able to do some static stepping while holding walker. Transfers:  Max asst wc to from mat.   Sitting balance is not reliable, pt drifts to the L. Bed Mobility:  Mod to max asst for sit to supine and supine to sit on mat. Wheelchair:  Not very effective for self propulsion--sort of uses feet and sort of uses hands. Body Structures Involved:  1. Voice/Speech  2. Joints  3. Muscles Body Functions Affected:  1. Mental  2. Cardio  3. Neuromusculoskeletal  4. Movement Related  5. Skin Related Activities and Participation Affected:  1. Learning and Applying Knowledge  2. General Tasks and Demands  3. Communication  4. Mobility  5. Self Care  6. Interpersonal Interactions and Relationships  7. Community, Social and [place]   Number of elements (examined above) that affect the Plan of Care: 3: MODERATE COMPLEXITY   CLINICAL PRESENTATION:   Presentation: Evolving clinical presentation with unstable and unpredictable characteristics: HIGH COMPLEXITY   CLINICAL DECISION MAKING:   Outcome Measure: Tool Used: Garza Balance Scale  Score:  Initial: 3/56 Most Recent: X/56 (Date: -- )   Interpretation of Score: Each section is scored on a 0-4 scale, 0 representing the patients inability to perform the task and 4 representing independence. The scores of each section are added together for a total score of 56. The higher the patients score, the more independent the patient is. Any score below 45 indicates increased risk for falls. Score 56 55-45 44-34 33-23 22-12 11-1 0   Modifier CH CI CJ CK CL CM CN     ? Mobility - Walking and Moving Around:     - CURRENT STATUS: CM - 80%-99% impaired, limited or restricted    - GOAL STATUS: CL - 60%-79% impaired, limited or restricted    - D/C STATUS:  ---------------To be determined---------------      Medical Necessity:   · Skilled intervention continues to be required due to multiple medical issues going on and her declining level of mobility. .  Reason for Services/Other Comments:  · Patient continues to require skilled intervention due to the need for exercises and for assist with mobility. .   Use of outcome tool(s) and clinical judgement create a POC that gives a: Difficult prediction of patient's progress: HIGH COMPLEXITY            TREATMENT:   (In addition to Assessment/Re-Assessment sessions the following treatments were rendered)    Pre-treatment Symptoms/Complaints:  Pt denies any pain at time of eval.  Agrees that she needs help with mobility. Pain: Initial: Pain Intensity 1: 3  Post Session:  3 to 2     Evaluation  (X)  Therapeutic Exercise  ( 20 min):  Exercises to improve general strength, balance, and mobility. Will progress intensity of exercise and decrease assistance provided as patient's progress allows. SLR--1 x 10 ---needs min asst, mostly for R leg. Bridging--2 x 10  Hip abd with manual assistance in hooklying  Alternate hip flexion in hooklying  Practiced sit to stand --static standing at walker. Static stepping, cues for improved control with R leg  Nustep x 5 min, with  Min asst    Treatment/Session Assessment:  Pt tolerated treatment well today. She seems t be moving easier after treatment than initially. Given her extensive medical history and her moderate to severe cognitive dysfunction, I expect progress to be slow and limited. Hopefully she will benefit from PT, with repetitive exercises, to improve transfers, bed mobility and short distance walking, to lessen the burden of car on her caregivers. · Response to Treatment:  Slight improvement ( maybe) in ease of movement. · Compliance with Program/Exercises: Will assess as treatment progresses. · Recommendations/Intent for next treatment session:     Next visit will focus on gross motor work for mobility, sit to stand and balance.   Total Treatment Duration:   60 min  Total number of treatments:  1    PT Patient Time In/Time Out  Time In: 1100  Time Out: 20 [facility]

## 2017-07-19 NOTE — PROGRESS NOTES
[facility]  : 5/15/1931 [facility]  [address], [facility], [address]  [phone]   [phone]          OUTPATIENT PHYSICAL THERAPY:Initial Assessment 2017    ICD-10: Treatment Diagnosis: R26.2  Difficulty walking  M62.81  Generalized muscle weakness  Precautions/Allergies:   Ivp dye [fd and c blue no.1]; Codeine; Iodine; Macrodantin [nitrofurantoin macrocrystalline]; Morphine; Povidone-iodine; and Sulfa (sulfonamide antibiotics)   Fall Risk Score: 4 (? 5 = High Risk)  MD Orders: eval and treat MEDICAL/REFERRING DIAGNOSIS:  Unsteady gait [R26.81]   DATE OF ONSET: chronic, and gradual onset  REFERRING PHYSICIAN: Citlali Lee NP  RETURN PHYSICIAN APPOINTMENT: TBD     INITIAL ASSESSMENT:  Ms. Robert Perera presents in wheelchair, with sitter. She is not able to give any accurate history, or to communicate in any really meaningful way. She has apparently had a significant, recent? decline in physical ability/mobility. According to chart, medical history is extensive; referral is for mobility, gait and balance exercise. At time of eval pt was at max to mod asst level for transfers, mat mobility, and sit to stand. Did not attempt ambulation with walker; pt reports pt can amb with walker and min to mod asst about 20'. PT will focus on repetitive mobility activities, standing balance and gait activities, to try to improve overall ability to move about, and to lessen the care burden on others. PROBLEM LIST (Impacting functional limitations):  1. Decreased Strength  2. Decreased ADL/Functional Activities  3. Decreased Transfer Abilities  4. Decreased Ambulation Ability/Technique  5. Decreased Balance  6. Decreased Activity Tolerance  7. Decreased Flexibility/Joint Mobility  8. Edema/Girth INTERVENTIONS PLANNED:  1. Balance Exercise  2. Bed Mobility  3. Gait Training  4.  Home Exercise Program (HEP)  5. Range of Motion (ROM)  6. Therapeutic Exercise/Strengthening   TREATMENT PLAN:  Effective Dates: 7- TO 10-2-2017. Frequency/Duration: 1 time a week for 8 weeks, and upon reassessment will adjust frequency and duration as progress indicates. GOALS: (Goals have been discussed and agreed upon with patient.)  Short-Term Functional Goals: Time Frame: 4 weeks  1. Transfers to/from wheelchair with min asst  2. Good dynamic sitting balance  3. Able to stand with single arm support for > 3 min, for asst with ADL activities. Discharge Goals: Time Frame: 8 weeks  1. Mat mobility with min to contact guard asst  2. Pt able to amb with rolling walker > 25' with min to contact guard asst  Rehabilitation Potential For Stated Goals: [address], I certify that the treatment plan above will be carried out by a therapist or under their direction. Thank you for this referral,  Jeraline Rinne, PT     Referring Physician Signature: Tequila Elizabeth NP              Date                    The information in this section was collected on 7- (except where otherwise noted). HISTORY:   History of Present Injury/Illness (Reason for Referral): Pt presents in wheelchair, with sitter assisting. She has very apparent cognitive deficits, and cannot relate her history, cannot complete sentences or thoughts. Her medical history, as noted below, is extensive, and includes many systemic problems, including CVA in 2009, chronic kidney failure, CAD, CHF ( has some swelling and discoloration in B feet);  S/p ?R hip fx with steffanie, CABG, back surgery, diabetes and neuropathy. At time of eval she required max asst to transfer from wheelchair to mat, and demonstrated poor siting balance at EOM. She is referred to help with gait and balance. Past Medical History/Comorbidities:   Ms. Josie Jane  has a past medical history of Abdominal aneurysm without mention of rupture (09/29/09);  Abdominal pain, epigastric (05/06/10); Abdominal pain, generalized (12/07/10); [facility] (5/18/2011); Acute, but ill-defined, cerebrovascular disease (09/29/09); Aneurysm ([facility] Anxiety state, unspecified (12/06/13); Arrhythmia; Arthritis; Atrial fibrillation ([facility] (12/21/09); Backache, unspecified (08/26/10); CAD (coronary artery disease) (2000); CHF - Chronic combined systolic \T\ diastolic (2/2/4457); Chronic pain; Chronic systolic heart failure ([facility] (03/22/10); Chronic UTI; CKD (chronic kidney disease), unspecified (7/6/2016); Congestive heart failure, unspecified (10/28/09); Coronary atherosclerosis of unspecified type of vessel, native or graft (09/22/09); Dehydration (10/28/09); Dementia (11/27/2009); Depression; Depressive disorder, not elsewhere classified (07/26/10); Diabetes ([facility] Diarrhea (09/05/12); Diverticulitis of colon (without mention of hemorrhage) (12/29/09); Dysuria (12/21/09); Gastrointestinal disorder; GERD (gastroesophageal reflux disease) (11/27/2009); Heart failure ([facility] Hematuria, unspecified (03/10/10); Hemorrhage of gastrointestinal tract, unspecified (12/30/09); Hemorrhage of rectum and anus (12/29/09); HTN (09/22/09); Hypotension due to drugs (7/6/2016); Hypotension, unspecified (10/13/09); Hypothyroidism; Insomnia, unspecified (12/06/13); Intestinal infection due to other organism, not elsewhere classified (03/22/10); Irritable bowel syndrome (05/12/10); Lumbago (09/09/10); Midline low back pain with sciatica; Mononeuritis of unspecified site (08/22/13); Muscle weakness (generalized) (10/13/09); Nonspecific abnormal results of liver function study (12/07/10); Nonspecific elevation of levels of transaminase or lactic acid dehydrogenase (LDH) (11/09/10); Other and unspecified hyperlipidemia (09/22/09); Other degenerative diseases of the basal ganglia (05/12/10); Other ill-defined conditions; Other nonspecific finding on examination of urine (02/17/10);  Other persistent mental disorders due to conditions classified elsewhere (03/22/10); 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 She also has no past medical history of Autoimmune disease ([facility] Difficult intubation; Liver disease; Pseudocholinesterase deficiency; PUD (peptic ulcer disease); Seizures ([facility] Thromboembolus ([facility] or Unspecified sleep apnea. Ms. Shelly Bean  has a past surgical history that includes cabg, arterial, three (2000); total abdominal hysterectomy; bladder suspension; cardiac surg procedure unlist; appendectomy; cholecystectomy; orthopaedic; hernia repair; gyn; heent; colonoscopy; and heart catheterization (4/26/94). Social History/Living Environment:     . Lives at home, with family support and sitters. Prior Level of Function/Work/Activity:  Long ago retired. Did  work. Dominant Side:         RIGHTPersonal Factors:          Age:  80 y.o. Current Medications:       Current Outpatient Prescriptions:     cephALEXin (KEFLEX) 250 mg capsule, Take 1 Cap by mouth daily. , Disp: 30 Cap, Rfl: 2    sertraline (ZOLOFT) 100 mg tablet, Take 1 Tab by mouth daily. , Disp: 90 Tab, Rfl: 4    levothyroxine (SYNTHROID) 112 mcg tablet, Take 1 Tab by mouth Daily (before breakfast). , Disp: 90 Tab, Rfl: 4    HYDROcodone-acetaminophen (NORCO) 5-325 mg per tablet, Take 1 Tab by mouth every four (4) hours as needed for Pain.  Max Daily Amount: 6 Tabs., Disp: 120 Tab, Rfl: 0    memantine (NAMENDA) 10 mg tablet, Take 1 Tab by mouth two (2) times a day., Disp: 180 Tab, Rfl: 3    amitriptyline (ELAVIL) 25 mg tablet, Take 1 Tab by mouth nightly., Disp: 90 Tab, Rfl: 4    rosuvastatin (CRESTOR) 10 mg tablet, Take 1 Tab by mouth nightly., Disp: 90 Tab, Rfl: 3    LORazepam (ATIVAN) 1 mg tablet, Take 1 Tab by mouth nightly as needed for Anxiety. Max Daily Amount: 1 mg., Disp: 30 Tab, Rfl: 5    diphenoxylate-atropine (LOMOTIL) 2.5-0.025 mg per tablet, Take 1 Tab by mouth four (4) times daily as needed for Diarrhea (1 tab after each stool for max 8 per day). Max Daily Amount: 4 Tabs. Take after each stool for a maximum of 8 tablets daily, Disp: 20 Tab, Rfl: 0    carvedilol (COREG) 6.25 mg tablet, Take 1 Tab by mouth two (2) times daily (with meals). , Disp: 180 Tab, Rfl: 3    raNITIdine (ZANTAC) 300 mg tablet, Take 1 Tab by mouth nightly., Disp: 90 Tab, Rfl: 3    rivaroxaban (XARELTO) 15 mg tab tablet, Take 1 Tab by mouth daily (with breakfast). , Disp: 90 Tab, Rfl: 3    ALPRAZolam (XANAX) 0.25 mg tablet, Take 0.25 mg by mouth daily as needed. , Disp: , Rfl:    Date Last Reviewed:  7-   Number of Personal Factors/Comorbidities that affect the Plan of Care: 3+: HIGH COMPLEXITY   EXAMINATION:   Observation/Orthostatic Postural Assessment:          Pt presents in wheelchair. Both feet have moderate edema around the ankles, and show some purple-jeremias discoloration. ROM:          Active bilat UE shoulder range is limited to about shoulder height. Can put hands on head, but not overhead. LEs have functional passive range. R hip shows very tight adductors and internal rotation. Difficult for her to get the leg to neutral.   Strength:          Generally 3 to 3+ throughout. Poorly coordinated UE movement. Cannot use UEs effectively to propel wc. Functional Mobility:         Gait/Ambulation:  Did not attempt ambulation at initial assessment. Max asst for sit to stand. Was able to do some static stepping while holding walker. Transfers:  Max asst wc to from mat.   Sitting balance is not reliable, pt drifts to the L. Bed Mobility:  Mod to max asst for sit to supine and supine to sit on mat. Wheelchair:  Not very effective for self propulsion--sort of uses feet and sort of uses hands. Body Structures Involved:  1. Voice/Speech  2. Joints  3. Muscles Body Functions Affected:  1. Mental  2. Cardio  3. Neuromusculoskeletal  4. Movement Related  5. Skin Related Activities and Participation Affected:  1. Learning and Applying Knowledge  2. General Tasks and Demands  3. Communication  4. Mobility  5. Self Care  6. Interpersonal Interactions and Relationships  7. Community, Social and [place]   Number of elements (examined above) that affect the Plan of Care: 3: MODERATE COMPLEXITY   CLINICAL PRESENTATION:   Presentation: Evolving clinical presentation with unstable and unpredictable characteristics: HIGH COMPLEXITY   CLINICAL DECISION MAKING:   Outcome Measure: Tool Used: Garza Balance Scale  Score:  Initial: 3/56 Most Recent: X/56 (Date: -- )   Interpretation of Score: Each section is scored on a 0-4 scale, 0 representing the patients inability to perform the task and 4 representing independence. The scores of each section are added together for a total score of 56. The higher the patients score, the more independent the patient is. Any score below 45 indicates increased risk for falls. Score 56 55-45 44-34 33-23 22-12 11-1 0   Modifier CH CI CJ CK CL CM CN     ? Mobility - Walking and Moving Around:     - CURRENT STATUS: CM - 80%-99% impaired, limited or restricted    - GOAL STATUS: CL - 60%-79% impaired, limited or restricted    - D/C STATUS:  ---------------To be determined---------------      Medical Necessity:   · Skilled intervention continues to be required due to multiple medical issues going on and her declining level of mobility. .  Reason for Services/Other Comments:  · Patient continues to require skilled intervention due to the need for exercises and for assist with mobility. .   Use of outcome tool(s) and clinical judgement create a POC that gives a: Difficult prediction of patient's progress: HIGH COMPLEXITY            TREATMENT:   (In addition to Assessment/Re-Assessment sessions the following treatments were rendered)    Pre-treatment Symptoms/Complaints:  Pt denies any pain at time of eval.  Agrees that she needs help with mobility. Pain: Initial: Pain Intensity 1: 3  Post Session:  3 to 2     Evaluation  (X)  Therapeutic Exercise  ( 20 min):  Exercises to improve general strength, balance, and mobility. Will progress intensity of exercise and decrease assistance provided as patient's progress allows. SLR--1 x 10 ---needs min asst, mostly for R leg. Bridging--2 x 10  Hip abd with manual assistance in hooklying  Alternate hip flexion in hooklying  Practiced sit to stand --static standing at walker. Static stepping, cues for improved control with R leg  Nustep x 5 min, with  Min asst    Treatment/Session Assessment:  Pt tolerated treatment well today. She seems t be moving easier after treatment than initially. Given her extensive medical history and her moderate to severe cognitive dysfunction, I expect progress to be slow and limited. Hopefully she will benefit from PT, with repetitive exercises, to improve transfers, bed mobility and short distance walking, to lessen the burden of car on her caregivers. · Response to Treatment:  Slight improvement ( maybe) in ease of movement. · Compliance with Program/Exercises: Will assess as treatment progresses. · Recommendations/Intent for next treatment session:     Next visit will focus on gross motor work for mobility, sit to stand and balance.   Total Treatment Duration:   60 min  Total number of treatments:  1    PT Patient Time In/Time Out  Time In: 1100  Time Out: 20 [facility]

## 2017-07-19 NOTE — PROGRESS NOTES
Ambulatory/Rehab Services H2 Model Falls Risk Assessment    Risk Factor Pts. ·   Confusion/Disorientation/Impulsivity  []    4 ·   Symptomatic Depression  []   2 ·   Altered Elimination  []   1 ·   Dizziness/Vertigo  []   1 ·   Gender (Male)  []   1 ·   Any administered antiepileptics (anticonvulsants):  []   2 ·   Any administered benzodiazepines:  []   1 ·   Visual Impairment (specify):  []   1 ·   Portable Oxygen Use  []   1 ·   Orthostatic ? BP  []   1 ·   History of Recent Falls (within 3 mos.)  []   5     Ability to Rise from Chair (choose one) Pts. ·   Ability to rise in a single movement  []   0 ·   Pushes up, successful in one attempt  []   1 ·   Multiple attempts, but successful  []   3 ·   Unable to rise without assistance  [x]   4   Total: (5 or greater = High Risk) 4     Falls Prevention Plan:   []                Physical Limitations to Exercise (specify):   []                Mobility Assistance Device (type):   []                Exercise/Equipment Adaptation (specify):    ©2010 [facility] [place] Patent #[id number].  Federal Law prohibits the replication, distribution or use without written permission from [facility] [employer]

## 2017-07-26 NOTE — PROGRESS NOTES
Georgann Spoon Callaham  : 5/15/1931 [facility]  [address], [address]  [phone]   [phone]          OUTPATIENT PHYSICAL THERAPY:Daily Note 2017    ICD-10: Treatment Diagnosis: R26.2  Difficulty walking  M62.81  Generalized muscle weakness  Precautions/Allergies:   Ivp dye [fd and c blue no.1]; Codeine; Iodine; Macrodantin [nitrofurantoin macrocrystalline]; Morphine; Povidone-iodine; and Sulfa (sulfonamide antibiotics)   Fall Risk Score: 4 (? 5 = High Risk)  MD Orders: eval and treat MEDICAL/REFERRING DIAGNOSIS:  Unsteadiness on feet [R26.81]   DATE OF ONSET: chronic, and gradual onset  REFERRING PHYSICIAN: Sharif James NP  RETURN PHYSICIAN APPOINTMENT: TBD     INITIAL ASSESSMENT:  Ms. Joaquín Bolton presents in wheelchair, with sitter. She is not able to give any accurate history, or to communicate in any really meaningful way. She has apparently had a significant, recent? decline in physical ability/mobility. According to chart, medical history is extensive; referral is for mobility, gait and balance exercise. At time of eval pt was at max to mod asst level for transfers, mat mobility, and sit to stand. Did not attempt ambulation with walker; pt reports pt can amb with walker and min to mod asst about 20'. PT will focus on repetitive mobility activities, standing balance and gait activities, to try to improve overall ability to move about, and to lessen the care burden on others. PROBLEM LIST (Impacting functional limitations):  1. Decreased Strength  2. Decreased ADL/Functional Activities  3. Decreased Transfer Abilities  4. Decreased Ambulation Ability/Technique  5. Decreased Balance  6. Decreased Activity Tolerance  7. Decreased Flexibility/Joint Mobility  8. Edema/Girth INTERVENTIONS PLANNED:  1. Balance Exercise  2. Bed Mobility  3. Gait Training  4.  Home Exercise Program (HEP)  5. Range of Motion (ROM)  6. Therapeutic Exercise/Strengthening   TREATMENT PLAN:  Effective Dates: 7- TO 10-2-2017. Frequency/Duration: 1 time a week for 8 weeks, and upon reassessment will adjust frequency and duration as progress indicates. GOALS: (Goals have been discussed and agreed upon with patient.)  Short-Term Functional Goals: Time Frame: 4 weeks  1. Transfers to/from wheelchair with min asst  2. Good dynamic sitting balance  3. Able to stand with single arm support for > 3 min, for asst with ADL activities. Discharge Goals: Time Frame: 8 weeks  1. Mat mobility with min to contact guard asst  2. Pt able to amb with rolling walker > 25' with min to contact guard asst  Rehabilitation Potential For Stated Goals: [address] therapy, I certify that the treatment plan above will be carried out by a therapist or under their direction. Thank you for this referral,  Paz Workman, PT     Referring Physician Signature: Goldie Israel NP              Date                    The information in this section was collected on 7- (except where otherwise noted). HISTORY:   History of Present Injury/Illness (Reason for Referral): Pt presents in wheelchair, with sitter assisting. She has very apparent cognitive deficits, and cannot relate her history, cannot complete sentences or thoughts. Her medical history, as noted below, is extensive, and includes many systemic problems, including CVA in 2009, chronic kidney failure, CAD, CHF ( has some swelling and discoloration in B feet);  S/p ?R hip fx with steffanie, CABG, back surgery, diabetes and neuropathy. At time of eval she required max asst to transfer from wheelchair to mat, and demonstrated poor siting balance at EOM. She is referred to help with gait and balance. Past Medical History/Comorbidities:   Ms. Cintia De Luna  has a past medical history of Abdominal aneurysm without mention of rupture (09/29/09);  Abdominal pain, epigastric (05/06/10); Abdominal pain, generalized (12/07/10); [facility] ([facility] (5/18/2011); Acute, but ill-defined, cerebrovascular disease (09/29/09); Aneurysm ([facility] Anxiety state, unspecified (12/06/13); Arrhythmia; Arthritis; Atrial fibrillation ([facility] (12/21/09); Backache, unspecified (08/26/10); CAD (coronary artery disease) (2000); CHF - Chronic combined systolic \T\ diastolic (2/4/4670); Chronic pain; Chronic systolic heart failure ([facility] (03/22/10); Chronic UTI; CKD (chronic kidney disease), unspecified (7/6/2016); Congestive heart failure, unspecified (10/28/09); Coronary atherosclerosis of unspecified type of vessel, native or graft (09/22/09); Dehydration (10/28/09); Dementia (11/27/2009); Depression; Depressive disorder, not elsewhere classified (07/26/10); Diabetes ([facility] Diarrhea (09/05/12); Diverticulitis of colon (without mention of hemorrhage) (12/29/09); Dysuria (12/21/09); Gastrointestinal disorder; GERD (gastroesophageal reflux disease) (11/27/2009); Heart failure ([facility] Hematuria, unspecified (03/10/10); Hemorrhage of gastrointestinal tract, unspecified (12/30/09); Hemorrhage of rectum and anus (12/29/09); HTN (09/22/09); Hypotension due to drugs (7/6/2016); Hypotension, unspecified (10/13/09); Hypothyroidism; Insomnia, unspecified (12/06/13); Intestinal infection due to other organism, not elsewhere classified (03/22/10); Irritable bowel syndrome (05/12/10); Lumbago (09/09/10); Midline low back pain with sciatica; Mononeuritis of unspecified site (08/22/13); Muscle weakness (generalized) (10/13/09); Nonspecific abnormal results of liver function study (12/07/10); Nonspecific elevation of levels of transaminase or lactic acid dehydrogenase (LDH) (11/09/10); Other and unspecified hyperlipidemia (09/22/09); Other degenerative diseases of the basal ganglia (05/12/10); Other ill-defined conditions; Other nonspecific finding on examination of urine (02/17/10);  Other persistent mental disorders due to conditions classified elsewhere (03/22/10); 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 She also has no past medical history of Autoimmune disease ([facility] Difficult intubation; Liver disease; Pseudocholinesterase deficiency; PUD (peptic ulcer disease); Seizures ([facility] Thromboembolus ([facility] or Unspecified sleep apnea. Ms. Dav Baptiste  has a past surgical history that includes cabg, arterial, three (2000); total abdominal hysterectomy; bladder suspension; cardiac surg procedure unlist; appendectomy; cholecystectomy; orthopaedic; hernia repair; gyn; heent; colonoscopy; and heart catheterization (4/26/94). Social History/Living Environment:     . Lives at home, with family support and sitters. Prior Level of Function/Work/Activity:  Long ago retired. Did  work. Dominant Side:         RIGHTPersonal Factors:          Age:  80 y.o. Current Medications:       Current Outpatient Prescriptions:     cephALEXin (KEFLEX) 250 mg capsule, Take 1 Cap by mouth daily. , Disp: 30 Cap, Rfl: 2    sertraline (ZOLOFT) 100 mg tablet, Take 1 Tab by mouth daily. , Disp: 90 Tab, Rfl: 4    levothyroxine (SYNTHROID) 112 mcg tablet, Take 1 Tab by mouth Daily (before breakfast). , Disp: 90 Tab, Rfl: 4    HYDROcodone-acetaminophen (NORCO) 5-325 mg per tablet, Take 1 Tab by mouth every four (4) hours as needed for Pain.  Max Daily Amount: 6 Tabs., Disp: 120 Tab, Rfl: 0    memantine (NAMENDA) 10 mg tablet, Take 1 Tab by mouth two (2) times a day., Disp: 180 Tab, Rfl: 3    amitriptyline (ELAVIL) 25 mg tablet, Take 1 Tab by mouth nightly., Disp: 90 Tab, Rfl: 4    rosuvastatin (CRESTOR) 10 mg tablet, Take 1 Tab by mouth nightly., Disp: 90 Tab, Rfl: 3    LORazepam (ATIVAN) 1 mg tablet, Take 1 Tab by mouth nightly as needed for Anxiety. Max Daily Amount: 1 mg., Disp: 30 Tab, Rfl: 5    diphenoxylate-atropine (LOMOTIL) 2.5-0.025 mg per tablet, Take 1 Tab by mouth four (4) times daily as needed for Diarrhea (1 tab after each stool for max 8 per day). Max Daily Amount: 4 Tabs. Take after each stool for a maximum of 8 tablets daily, Disp: 20 Tab, Rfl: 0    carvedilol (COREG) 6.25 mg tablet, Take 1 Tab by mouth two (2) times daily (with meals). , Disp: 180 Tab, Rfl: 3    raNITIdine (ZANTAC) 300 mg tablet, Take 1 Tab by mouth nightly., Disp: 90 Tab, Rfl: 3    rivaroxaban (XARELTO) 15 mg tab tablet, Take 1 Tab by mouth daily (with breakfast). , Disp: 90 Tab, Rfl: 3    ALPRAZolam (XANAX) 0.25 mg tablet, Take 0.25 mg by mouth daily as needed. , Disp: , Rfl:    Date Last Reviewed:  7-   Number of Personal Factors/Comorbidities that affect the Plan of Care: 3+: HIGH COMPLEXITY   EXAMINATION:   Observation/Orthostatic Postural Assessment:          Pt presents in wheelchair. Both feet have moderate edema around the ankles, and show some purple-jeremias discoloration. ROM:          Active bilat UE shoulder range is limited to about shoulder height. Can put hands on head, but not overhead. LEs have functional passive range. R hip shows very tight adductors and internal rotation. Difficult for her to get the leg to neutral.   Strength:          Generally 3 to 3+ throughout. Poorly coordinated UE movement. Cannot use UEs effectively to propel wc. Functional Mobility:         Gait/Ambulation:  Did not attempt ambulation at initial assessment. Max asst for sit to stand. Was able to do some static stepping while holding walker. Transfers:  Max asst wc to from mat.   Sitting balance is not reliable, pt drifts to the L. Bed Mobility:  Mod to max asst for sit to supine and supine to sit on mat. Wheelchair:  Not very effective for self propulsion--sort of uses feet and sort of uses hands. Body Structures Involved:  1. Voice/Speech  2. Joints  3. Muscles Body Functions Affected:  1. Mental  2. Cardio  3. Neuromusculoskeletal  4. Movement Related  5. Skin Related Activities and Participation Affected:  1. Learning and Applying Knowledge  2. General Tasks and Demands  3. Communication  4. Mobility  5. Self Care  6. Interpersonal Interactions and Relationships  7. Community, Social and [place]   Number of elements (examined above) that affect the Plan of Care: 3: MODERATE COMPLEXITY   CLINICAL PRESENTATION:   Presentation: Evolving clinical presentation with unstable and unpredictable characteristics: HIGH COMPLEXITY   CLINICAL DECISION MAKING:   Outcome Measure: Tool Used: Garza Balance Scale  Score:  Initial: 3/56 Most Recent: X/56 (Date: -- )   Interpretation of Score: Each section is scored on a 0-4 scale, 0 representing the patients inability to perform the task and 4 representing independence. The scores of each section are added together for a total score of 56. The higher the patients score, the more independent the patient is. Any score below 45 indicates increased risk for falls. Score 56 55-45 44-34 33-23 22-12 11-1 0   Modifier CH CI CJ CK CL CM CN     ? Mobility - Walking and Moving Around:     - CURRENT STATUS: CM - 80%-99% impaired, limited or restricted    - GOAL STATUS: CL - 60%-79% impaired, limited or restricted    - D/C STATUS:  ---------------To be determined---------------      Medical Necessity:   · Skilled intervention continues to be required due to multiple medical issues going on and her declining level of mobility. .  Reason for Services/Other Comments:  · Patient continues to require skilled intervention due to the need for exercises and for assist with mobility. .   Use of outcome tool(s) and clinical judgement create a POC that gives a: Difficult prediction of patient's progress: HIGH COMPLEXITY            TREATMENT:   (In addition to Assessment/Re-Assessment sessions the following treatments were rendered)    Pre-treatment Symptoms/Complaints:  Nothing new t report by/from the pt or her sitter. Pain: Initial: Pain Intensity 1: 2  Post Session:  3 to 2       Therapeutic Exercise  ( 45min):  Exercises to improve general strength, balance, and mobility. Will progress intensity of exercise and decrease assistance provided as patient's progress allows. Alternate hip flexion at EOM  LAQs--1 x 12  SLR--1 x 10 ---needs min asst, mostly for R leg. Bridging--2 x 10  Hip abd with manual  Guidance in hooklying  Alternate hip flexion in hooklying  Practiced sit to stand --static standing at walker. Static stepping, cues for improved control with R leg  Standing heel raises--1 x 10---quality is poor  Did balance activities in standing, some with 1 arm reaching and briefly without UE support. Walked with walker repeated short trips of ~ 8-10 ft, with min asst.  Sit to stand today was much better than last week. Did one walk of about 21' with min asst.      Treatment/Session Assessment:  Pt tolerated treatment well today. Today was a good day; she required much less asst for sit to stand, and her sitting balance was good. Given her extensive medical history and her moderate to severe cognitive dysfunction, I expect progress to be slow and limited. Hopefully she will benefit from PT, with repetitive exercises, to improve transfers, bed mobility and short distance walking, to lessen the burden of car on her caregivers. · Response to Treatment:  Slight improvement in ease of movement. · Compliance with Program/Exercises: Will assess as treatment progresses.   · Recommendations/Intent for next treatment session:     Next visit will focus on gross motor work for mobility, sit to stand and balance.   Total Treatment Duration:   45 min  Total number of treatments:  2    PT Patient Time In/Time Out  Time In: 1030  Time Out: Magui Ramsey 79, PT

## 2017-08-02 NOTE — PROGRESS NOTES
Latasha Martines  : 5/15/1931 [facility]  [address], [address]  [phone]   [phone]          OUTPATIENT PHYSICAL THERAPY:Daily Note 2017    ICD-10: Treatment Diagnosis: R26.2  Difficulty walking  M62.81  Generalized muscle weakness  Precautions/Allergies:   Ivp dye [fd and c blue no.1]; Codeine; Iodine; Macrodantin [nitrofurantoin macrocrystalline]; Morphine; Povidone-iodine; and Sulfa (sulfonamide antibiotics)   Fall Risk Score: 4 (? 5 = High Risk)  MD Orders: eval and treat MEDICAL/REFERRING DIAGNOSIS:  Unsteadiness on feet [R26.81]   DATE OF ONSET: chronic, and gradual onset  REFERRING PHYSICIAN: Tiffany Vanegas NP  RETURN PHYSICIAN APPOINTMENT: TBD     INITIAL ASSESSMENT:  Ms. Shin Berrios presents in wheelchair, with sitter. She is not able to give any accurate history, or to communicate in any really meaningful way. She has apparently had a significant, recent? decline in physical ability/mobility. According to chart, medical history is extensive; referral is for mobility, gait and balance exercise. At time of eval pt was at max to mod asst level for transfers, mat mobility, and sit to stand. Did not attempt ambulation with walker; pt reports pt can amb with walker and min to mod asst about 20'. PT will focus on repetitive mobility activities, standing balance and gait activities, to try to improve overall ability to move about, and to lessen the care burden on others. PROBLEM LIST (Impacting functional limitations):  1. Decreased Strength  2. Decreased ADL/Functional Activities  3. Decreased Transfer Abilities  4. Decreased Ambulation Ability/Technique  5. Decreased Balance  6. Decreased Activity Tolerance  7. Decreased Flexibility/Joint Mobility  8. Edema/Girth INTERVENTIONS PLANNED:  1. Balance Exercise  2. Bed Mobility  3. Gait Training  4.  Home Exercise Program (HEP)  5. Range of Motion (ROM)  6. Therapeutic Exercise/Strengthening   TREATMENT PLAN:  Effective Dates: 7- TO 10-2-2017. Frequency/Duration: 1 time a week for 8 weeks, and upon reassessment will adjust frequency and duration as progress indicates. GOALS: (Goals have been discussed and agreed upon with patient.)  Short-Term Functional Goals: Time Frame: 4 weeks  1. Transfers to/from wheelchair with min asst  2. Good dynamic sitting balance  3. Able to stand with single arm support for > 3 min, for asst with ADL activities. Discharge Goals: Time Frame: 8 weeks  1. Mat mobility with min to contact guard asst  2. Pt able to amb with rolling walker > 25' with min to contact guard asst  Rehabilitation Potential For Stated Goals: [address] therapy, I certify that the treatment plan above will be carried out by a therapist or under their direction. Thank you for this referral,  Rosi Ruffin, PT     Referring Physician Signature: Deacon Gracia NP              Date                    The information in this section was collected on 7- (except where otherwise noted). HISTORY:   History of Present Injury/Illness (Reason for Referral): Pt presents in wheelchair, with sitter assisting. She has very apparent cognitive deficits, and cannot relate her history, cannot complete sentences or thoughts. Her medical history, as noted below, is extensive, and includes many systemic problems, including CVA in 2009, chronic kidney failure, CAD, CHF ( has some swelling and discoloration in B feet);  S/p ?R hip fx with steffanie, CABG, back surgery, diabetes and neuropathy. At time of eval she required max asst to transfer from wheelchair to mat, and demonstrated poor siting balance at EOM. She is referred to help with gait and balance. Past Medical History/Comorbidities:   Ms. Katherin Higgins  has a past medical history of Abdominal aneurysm without mention of rupture (09/29/09);  Abdominal pain, epigastric (05/06/10); Abdominal pain, generalized (12/07/10); Acute renal failure ([facility] (5/18/2011); Acute, but ill-defined, cerebrovascular disease (09/29/09); Aneurysm ([facility] Anxiety state, unspecified (12/06/13); Arrhythmia; Arthritis; Atrial fibrillation ([facility] (12/21/09); Backache, unspecified (08/26/10); CAD (coronary artery disease) (2000); CHF - Chronic combined systolic \T\ diastolic (9/9/3564); Chronic pain; Chronic systolic heart failure ([facility] (03/22/10); Chronic UTI; CKD (chronic kidney disease), unspecified (7/6/2016); Congestive heart failure, unspecified (10/28/09); Coronary atherosclerosis of unspecified type of vessel, native or graft (09/22/09); Dehydration (10/28/09); Dementia (11/27/2009); Depression; Depressive disorder, not elsewhere classified (07/26/10); Diabetes ([facility] Diarrhea (09/05/12); Diverticulitis of colon (without mention of hemorrhage) (12/29/09); Dysuria (12/21/09); Gastrointestinal disorder; GERD (gastroesophageal reflux disease) (11/27/2009); Heart failure ([facility] Hematuria, unspecified (03/10/10); Hemorrhage of gastrointestinal tract, unspecified (12/30/09); Hemorrhage of rectum and anus (12/29/09); HTN (09/22/09); Hypotension due to drugs (7/6/2016); Hypotension, unspecified (10/13/09); Hypothyroidism; Insomnia, unspecified (12/06/13); Intestinal infection due to other organism, not elsewhere classified (03/22/10); Irritable bowel syndrome (05/12/10); Lumbago (09/09/10); Midline low back pain with sciatica; Mononeuritis of unspecified site (08/22/13); Muscle weakness (generalized) (10/13/09); Nonspecific abnormal results of liver function study (12/07/10); Nonspecific elevation of levels of transaminase or lactic acid dehydrogenase (LDH) (11/09/10); Other and unspecified hyperlipidemia (09/22/09); Other degenerative diseases of the basal ganglia (05/12/10); Other ill-defined conditions; Other nonspecific finding on examination of urine (02/17/10);  Other persistent mental disorders due to conditions classified elsewhere (03/22/10); 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 She also has no past medical history of Autoimmune disease ([facility] Difficult intubation; Liver disease; Pseudocholinesterase deficiency; PUD (peptic ulcer disease); Seizures ([facility] Thromboembolus ([facility] or Unspecified sleep apnea. Ms. Elizabeth Solomon  has a past surgical history that includes cabg, arterial, three (2000); total abdominal hysterectomy; bladder suspension; cardiac surg procedure unlist; appendectomy; cholecystectomy; orthopaedic; hernia repair; gyn; heent; colonoscopy; and heart catheterization (4/26/94). Social History/Living Environment:     . Lives at home, with family support and sitters. Prior Level of Function/Work/Activity:  Long ago retired. Did  work. Dominant Side:         RIGHTPersonal Factors:          Age:  80 y.o. Current Medications:       Current Outpatient Prescriptions:     cephALEXin (KEFLEX) 250 mg capsule, Take 1 Cap by mouth daily. , Disp: 30 Cap, Rfl: 2    sertraline (ZOLOFT) 100 mg tablet, Take 1 Tab by mouth daily. , Disp: 90 Tab, Rfl: 4    levothyroxine (SYNTHROID) 112 mcg tablet, Take 1 Tab by mouth Daily (before breakfast). , Disp: 90 Tab, Rfl: 4    HYDROcodone-acetaminophen (NORCO) 5-325 mg per tablet, Take 1 Tab by mouth every four (4) hours as needed for Pain.  Max Daily Amount: 6 Tabs., Disp: 120 Tab, Rfl: 0    memantine (NAMENDA) 10 mg tablet, Take 1 Tab by mouth two (2) times a day., Disp: 180 Tab, Rfl: 3    amitriptyline (ELAVIL) 25 mg tablet, Take 1 Tab by mouth nightly., Disp: 90 Tab, Rfl: 4    rosuvastatin (CRESTOR) 10 mg tablet, Take 1 Tab by mouth nightly., Disp: 90 Tab, Rfl: 3    LORazepam (ATIVAN) 1 mg tablet, Take 1 Tab by mouth nightly as needed for Anxiety. Max Daily Amount: 1 mg., Disp: 30 Tab, Rfl: 5    diphenoxylate-atropine (LOMOTIL) 2.5-0.025 mg per tablet, Take 1 Tab by mouth four (4) times daily as needed for Diarrhea (1 tab after each stool for max 8 per day). Max Daily Amount: 4 Tabs. Take after each stool for a maximum of 8 tablets daily, Disp: 20 Tab, Rfl: 0    carvedilol (COREG) 6.25 mg tablet, Take 1 Tab by mouth two (2) times daily (with meals). , Disp: 180 Tab, Rfl: 3    raNITIdine (ZANTAC) 300 mg tablet, Take 1 Tab by mouth nightly., Disp: 90 Tab, Rfl: 3    rivaroxaban (XARELTO) 15 mg tab tablet, Take 1 Tab by mouth daily (with breakfast). , Disp: 90 Tab, Rfl: 3    ALPRAZolam (XANAX) 0.25 mg tablet, Take 0.25 mg by mouth daily as needed. , Disp: , Rfl:    Date Last Reviewed:  7-   Number of Personal Factors/Comorbidities that affect the Plan of Care: 3+: HIGH COMPLEXITY   EXAMINATION:   Observation/Orthostatic Postural Assessment:          Pt presents in wheelchair. Both feet have moderate edema around the ankles, and show some purple-jeremias discoloration. ROM:          Active bilat UE shoulder range is limited to about shoulder height. Can put hands on head, but not overhead. LEs have functional passive range. R hip shows very tight adductors and internal rotation. Difficult for her to get the leg to neutral.   Strength:          Generally 3 to 3+ throughout. Poorly coordinated UE movement. Cannot use UEs effectively to propel wc. Functional Mobility:         Gait/Ambulation:  Did not attempt ambulation at initial assessment. Max asst for sit to stand. Was able to do some static stepping while holding walker. Transfers:  Max asst wc to from mat.   Sitting balance is not reliable, pt drifts to the L. Bed Mobility:  Mod to max asst for sit to supine and supine to sit on mat. Wheelchair:  Not very effective for self propulsion--sort of uses feet and sort of uses hands. Body Structures Involved:  1. Voice/Speech  2. Joints  3. Muscles Body Functions Affected:  1. Mental  2. Cardio  3. Neuromusculoskeletal  4. Movement Related  5. Skin Related Activities and Participation Affected:  1. Learning and Applying Knowledge  2. General Tasks and Demands  3. Communication  4. Mobility  5. Self Care  6. Interpersonal Interactions and Relationships  7. Community, Social and [place]   Number of elements (examined above) that affect the Plan of Care: 3: MODERATE COMPLEXITY   CLINICAL PRESENTATION:   Presentation: Evolving clinical presentation with unstable and unpredictable characteristics: HIGH COMPLEXITY   CLINICAL DECISION MAKING:   Outcome Measure: Tool Used: Garza Balance Scale  Score:  Initial: 3/56 Most Recent: X/56 (Date: -- )   Interpretation of Score: Each section is scored on a 0-4 scale, 0 representing the patients inability to perform the task and 4 representing independence. The scores of each section are added together for a total score of 56. The higher the patients score, the more independent the patient is. Any score below 45 indicates increased risk for falls. Score 56 55-45 44-34 33-23 22-12 11-1 0   Modifier CH CI CJ CK CL CM CN     ? Mobility - Walking and Moving Around:     - CURRENT STATUS: CM - 80%-99% impaired, limited or restricted    - GOAL STATUS: CL - 60%-79% impaired, limited or restricted    - D/C STATUS:  ---------------To be determined---------------      Medical Necessity:   · Skilled intervention continues to be required due to multiple medical issues going on and her declining level of mobility. .  Reason for Services/Other Comments:  · Patient continues to require skilled intervention due to the need for exercises and for assist with mobility. .   Use of outcome tool(s) and clinical judgement create a POC that gives a: Difficult prediction of patient's progress: HIGH COMPLEXITY            TREATMENT:   (In addition to Assessment/Re-Assessment sessions the following treatments were rendered)    Pre-treatment Symptoms/Complaints:  Nothing new to report by/from the pt or her sitter. Pain: Initial: Pain Intensity 1: 0  Post Session:  3 to 2       Therapeutic Exercise  ( 45min):  Exercises to improve general strength, balance, and mobility. Will progress intensity of exercise and decrease assistance provided as patient's progress allows. Alternate hip flexion at EOM  LAQs--1 x 12  SLR--1 x 10 ---needs min asst, mostly for R leg. Bridging--2 x 10, needs min contact guard to facilitate  Hip abd with manual  Guidance in hooklying  Alternate hip flexion in hooklying  Practiced sit to stand --static standing at walker. Static stepping, cues for improved control with R leg  Did UE activity with cones in standing, but needed min asst for balance . Standing heel raises--1 x 10---quality is poor   Walked with walker 2 short trips of ~ 8-10 ft, with min asst.    Nustep, needs constant cues to continue---6 min      Treatment/Session Assessment:  Pt tolerated treatment well today. Overall her level of alertness and ability to participate today was not as good as last week. Sitting balance was poor today. Apparently there is a big fluctuation in her physical and cognitive abilities. Given her extensive medical history and her moderate to severe cognitive dysfunction, I expect progress to be slow and limited. Hopefully she will benefit from PT, with repetitive exercises, to improve transfers, bed mobility and short distance walking, to lessen the burden of car on her caregivers. · Response to Treatment:  Slight improvement in ease of movement. · Compliance with Program/Exercises:  Will assess as treatment progresses. · Recommendations/Intent for next treatment session:     Next visit will focus on gross motor work for mobility, sit to stand and balance.   Total Treatment Duration:   45 min  Total number of treatments:  32    PT Patient Time In/Time Out  Time In: 1030  Time Out: Magui Ramsey 79, PT

## 2017-08-09 NOTE — PROGRESS NOTES
Georgann Spoon Callaham  : 5/15/1931 [facility]  [address], [address]  [phone]   [phone]          OUTPATIENT PHYSICAL THERAPY:Daily Note 2017    ICD-10: Treatment Diagnosis: R26.2  Difficulty walking  M62.81  Generalized muscle weakness  Precautions/Allergies:   Ivp dye [fd and c blue no.1]; Codeine; Iodine; Macrodantin [nitrofurantoin macrocrystalline]; Morphine; Povidone-iodine; and Sulfa (sulfonamide antibiotics)   Fall Risk Score: 4 (? 5 = High Risk)  MD Orders: eval and treat MEDICAL/REFERRING DIAGNOSIS:  Unsteadiness on feet [R26.81]   DATE OF ONSET: chronic, and gradual onset  REFERRING PHYSICIAN: Sharif James NP  RETURN PHYSICIAN APPOINTMENT: TBD     INITIAL ASSESSMENT:  Ms. Joaquín Bolton presents in wheelchair, with sitter. She is not able to give any accurate history, or to communicate in any really meaningful way. She has apparently had a significant, recent? decline in physical ability/mobility. According to chart, medical history is extensive; referral is for mobility, gait and balance exercise. At time of eval pt was at max to mod asst level for transfers, mat mobility, and sit to stand. Did not attempt ambulation with walker; pt reports pt can amb with walker and min to mod asst about 20'. PT will focus on repetitive mobility activities, standing balance and gait activities, to try to improve overall ability to move about, and to lessen the care burden on others. PROBLEM LIST (Impacting functional limitations):  1. Decreased Strength  2. Decreased ADL/Functional Activities  3. Decreased Transfer Abilities  4. Decreased Ambulation Ability/Technique  5. Decreased Balance  6. Decreased Activity Tolerance  7. Decreased Flexibility/Joint Mobility  8. Edema/Girth INTERVENTIONS PLANNED:  1. Balance Exercise  2. Bed Mobility  3. Gait Training  4.  Home Exercise Program (HEP)  5. Range of Motion (ROM)  6. Therapeutic Exercise/Strengthening   TREATMENT PLAN:  Effective Dates: 7- TO 10-2-2017. Frequency/Duration: 1 time a week for 8 weeks, and upon reassessment will adjust frequency and duration as progress indicates. GOALS: (Goals have been discussed and agreed upon with patient.)  Short-Term Functional Goals: Time Frame: 4 weeks  1. Transfers to/from wheelchair with min asst  2. Good dynamic sitting balance  3. Able to stand with single arm support for > 3 min, for asst with ADL activities. Discharge Goals: Time Frame: 8 weeks  1. Mat mobility with min to contact guard asst  2. Pt able to amb with rolling walker > 25' with min to contact guard asst  Rehabilitation Potential For Stated Goals: [address] [place] therapy, I certify that the treatment plan above will be carried out by a therapist or under their direction. Thank you for this referral,  Evelin Riojas, PT     Referring Physician Signature: Juanita Cutler, WILLIAM              Date                    The information in this section was collected on 7- (except where otherwise noted). HISTORY:   History of Present Injury/Illness (Reason for Referral): Pt presents in wheelchair, with sitter assisting. She has very apparent cognitive deficits, and cannot relate her history, cannot complete sentences or thoughts. Her medical history, as noted below, is extensive, and includes many systemic problems, including CVA in 2009, chronic kidney failure, CAD, CHF ( has some swelling and discoloration in B feet);  S/p ?R hip fx with steffanie, CABG, back surgery, diabetes and neuropathy. At time of eval she required max asst to transfer from wheelchair to mat, and demonstrated poor siting balance at EOM. She is referred to help with gait and balance. Past Medical History/Comorbidities:   Ms. Cherie Ruiz  has a past medical history of Abdominal aneurysm without mention of rupture (09/29/09);  Abdominal pain, epigastric (05/06/10); Abdominal pain, generalized (12/07/10); Acute renal failure ([facility] (5/18/2011); Acute, but ill-defined, cerebrovascular disease (09/29/09); Aneurysm ([facility] Anxiety state, unspecified (12/06/13); Arrhythmia; Arthritis; Atrial fibrillation ([facility] (12/21/09); Backache, unspecified (08/26/10); CAD (coronary artery disease) (2000); CHF - Chronic combined systolic \T\ diastolic (5/5/2223); Chronic pain; Chronic systolic heart failure ([facility] (03/22/10); Chronic UTI; CKD (chronic kidney disease), unspecified (7/6/2016); Congestive heart failure, unspecified (10/28/09); Coronary atherosclerosis of unspecified type of vessel, native or graft (09/22/09); Dehydration (10/28/09); Dementia (11/27/2009); Depression; Depressive disorder, not elsewhere classified (07/26/10); Diabetes ([facility] Diarrhea (09/05/12); Diverticulitis of colon (without mention of hemorrhage) (12/29/09); Dysuria (12/21/09); Gastrointestinal disorder; GERD (gastroesophageal reflux disease) (11/27/2009); Heart failure ([facility] Hematuria, unspecified (03/10/10); Hemorrhage of gastrointestinal tract, unspecified (12/30/09); Hemorrhage of rectum and anus (12/29/09); HTN (09/22/09); Hypotension due to drugs (7/6/2016); Hypotension, unspecified (10/13/09); Hypothyroidism; Insomnia, unspecified (12/06/13); Intestinal infection due to other organism, not elsewhere classified (03/22/10); Irritable bowel syndrome (05/12/10); Lumbago (09/09/10); Midline low back pain with sciatica; Mononeuritis of unspecified site (08/22/13); Muscle weakness (generalized) (10/13/09); Nonspecific abnormal results of liver function study (12/07/10); Nonspecific elevation of levels of transaminase or lactic acid dehydrogenase (LDH) (11/09/10); Other and unspecified hyperlipidemia (09/22/09); Other degenerative diseases of the basal ganglia (05/12/10); Other ill-defined conditions; Other nonspecific finding on examination of urine (02/17/10);  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 She also has no past medical history of Autoimmune disease ([facility] Difficult intubation; Liver disease; Pseudocholinesterase deficiency; PUD (peptic ulcer disease); Seizures ([facility] Thromboembolus ([facility] or Unspecified sleep apnea. Ms. Daylin Dukes  has a past surgical history that includes cabg, arterial, three (2000); total abdominal hysterectomy; bladder suspension; cardiac surg procedure unlist; appendectomy; cholecystectomy; orthopaedic; hernia repair; gyn; heent; colonoscopy; and heart catheterization (4/26/94). Social History/Living Environment:     . Lives at home, with family support and sitters. Prior Level of Function/Work/Activity:  Long ago retired. Did  work. Dominant Side:         RIGHTPersonal Factors:          Age:  80 y.o. Current Medications:       Current Outpatient Prescriptions:     cephALEXin (KEFLEX) 250 mg capsule, Take 1 Cap by mouth daily. , Disp: 30 Cap, Rfl: 2    sertraline (ZOLOFT) 100 mg tablet, Take 1 Tab by mouth daily. , Disp: 90 Tab, Rfl: 4    levothyroxine (SYNTHROID) 112 mcg tablet, Take 1 Tab by mouth Daily (before breakfast). , Disp: 90 Tab, Rfl: 4    HYDROcodone-acetaminophen (NORCO) 5-325 mg per tablet, Take 1 Tab by mouth every four (4) hours as needed for Pain.  Max Daily Amount: 6 Tabs., Disp: 120 Tab, Rfl: 0    memantine (NAMENDA) 10 mg tablet, Take 1 Tab by mouth two (2) times a day., Disp: 180 Tab, Rfl: 3    amitriptyline (ELAVIL) 25 mg tablet, Take 1 Tab by mouth nightly., Disp: 90 Tab, Rfl: 4    rosuvastatin (CRESTOR) 10 mg tablet, Take 1 Tab by mouth nightly., Disp: 90 Tab, Rfl: 3    LORazepam (ATIVAN) 1 mg tablet, Take 1 Tab by mouth nightly as needed for Anxiety. Max Daily Amount: 1 mg., Disp: 30 Tab, Rfl: 5    diphenoxylate-atropine (LOMOTIL) 2.5-0.025 mg per tablet, Take 1 Tab by mouth four (4) times daily as needed for Diarrhea (1 tab after each stool for max 8 per day). Max Daily Amount: 4 Tabs. Take after each stool for a maximum of 8 tablets daily, Disp: 20 Tab, Rfl: 0    carvedilol (COREG) 6.25 mg tablet, Take 1 Tab by mouth two (2) times daily (with meals). , Disp: 180 Tab, Rfl: 3    raNITIdine (ZANTAC) 300 mg tablet, Take 1 Tab by mouth nightly., Disp: 90 Tab, Rfl: 3    rivaroxaban (XARELTO) 15 mg tab tablet, Take 1 Tab by mouth daily (with breakfast). , Disp: 90 Tab, Rfl: 3    ALPRAZolam (XANAX) 0.25 mg tablet, Take 0.25 mg by mouth daily as needed. , Disp: , Rfl:    Date Last Reviewed:  7-   Number of Personal Factors/Comorbidities that affect the Plan of Care: 3+: HIGH COMPLEXITY   EXAMINATION:   Observation/Orthostatic Postural Assessment:          Pt presents in wheelchair. Both feet have moderate edema around the ankles, and show some purple-jeremias discoloration. ROM:          Active bilat UE shoulder range is limited to about shoulder height. Can put hands on head, but not overhead. LEs have functional passive range. R hip shows very tight adductors and internal rotation. Difficult for her to get the leg to neutral.   Strength:          Generally 3 to 3+ throughout. Poorly coordinated UE movement. Cannot use UEs effectively to propel wc. Functional Mobility:         Gait/Ambulation:  Did not attempt ambulation at initial assessment. Max asst for sit to stand. Was able to do some static stepping while holding walker. Transfers:  Max asst wc to from mat.   Sitting balance is not reliable, pt drifts to the L. Bed Mobility:  Mod to max asst for sit to supine and supine to sit on mat. Wheelchair:  Not very effective for self propulsion--sort of uses feet and sort of uses hands. Body Structures Involved:  1. Voice/Speech  2. Joints  3. Muscles Body Functions Affected:  1. Mental  2. Cardio  3. Neuromusculoskeletal  4. Movement Related  5. Skin Related Activities and Participation Affected:  1. Learning and Applying Knowledge  2. General Tasks and Demands  3. Communication  4. Mobility  5. Self Care  6. Interpersonal Interactions and Relationships  7. Community, Social and [place]   Number of elements (examined above) that affect the Plan of Care: 3: MODERATE COMPLEXITY   CLINICAL PRESENTATION:   Presentation: Evolving clinical presentation with unstable and unpredictable characteristics: HIGH COMPLEXITY   CLINICAL DECISION MAKING:   Outcome Measure: Tool Used: Garza Balance Scale  Score:  Initial: 3/56 Most Recent: X/56 (Date: -- )   Interpretation of Score: Each section is scored on a 0-4 scale, 0 representing the patients inability to perform the task and 4 representing independence. The scores of each section are added together for a total score of 56. The higher the patients score, the more independent the patient is. Any score below 45 indicates increased risk for falls. Score 56 55-45 44-34 33-23 22-12 11-1 0   Modifier CH CI CJ CK CL CM CN     ? Mobility - Walking and Moving Around:     - CURRENT STATUS: CM - 80%-99% impaired, limited or restricted    - GOAL STATUS: CL - 60%-79% impaired, limited or restricted    - D/C STATUS:  ---------------To be determined---------------      Medical Necessity:   · Skilled intervention continues to be required due to multiple medical issues going on and her declining level of mobility. .  Reason for Services/Other Comments:  · Patient continues to require skilled intervention due to the need for exercises and for assist with mobility. .   Use of outcome tool(s) and clinical judgement create a POC that gives a: Difficult prediction of patient's progress: HIGH COMPLEXITY            TREATMENT:   (In addition to Assessment/Re-Assessment sessions the following treatments were rendered)    Pre-treatment Symptoms/Complaints:  Patient's caregiver states that she had trouble getting her up on her feet this morning and into the car. Pain: Initial: Pain Intensity 1: 1  Post Session:  1       Therapeutic Exercise  ( 45min):  Exercises to improve general strength, balance, and mobility. Will progress intensity of exercise and decrease assistance provided as patient's progress allows. Exercise performed today:  LAQs 2 x 12  Sit to stand 3 x 5 with CGA  Hamstring curls with yellow tband seated 2 x 12  Ball squeeze 2 x 12  Hip abd with resistance(yellow t band) 2 x 12  Seated rows with yellow t band 2 x 12  Held all other exercises today:  Alternate hip flexion   LAQs--1 x 12  SLR--1 x 10 ---needs min asst, mostly for R leg. Bridging--2 x 10, needs min contact guard to facilitate- held   Hip abd with manual  Guidance in hooklying- held  Alternate hip flexion in hooklying- held   Practiced sit to stand --static standing at walker. Static stepping, cues for improved control with R leg  Did UE activity with cones in standing, but needed min asst for balance . Standing heel raises--1 x 10---quality is poor   Walked with walker 2 short trips of ~ 8-10 ft, with min asst. - held    Nustep, needs constant cues to continue---6 min  Held     Treatment/Session Assessment:  Pt tolerated treatment well today. Patient really struggled with alertness, and standing today. She required both verbal and tactile cues throughout treatment. Caregiver explained that she seems to have a harder time when she stays with the son than when she stays with the daughter. She seemed to tire easy even with the seated exercises.  Very pleasant and showed a willingness to try. · Response to Treatment:  Slight improvement in ease of movement. · Compliance with Program/Exercises: Will assess as treatment progresses. · Recommendations/Intent for next treatment session:     Next visit will focus on gross motor work for mobility, sit to stand and balance.   Total Treatment Duration:   45 min  Total number of treatments: 4     PT Patient Time In/Time Out  Time In: 1100  Time Out: [address] [place], [place]

## 2017-08-16 NOTE — PROGRESS NOTES
Stefania Martines  : 5/15/1931 [facility] [place] [address] [facility]  [address], [address]  [phone]   [phone]          OUTPATIENT PHYSICAL THERAPY:Daily Note 2017    ICD-10: Treatment Diagnosis: R26.2  Difficulty walking  M62.81  Generalized muscle weakness  Precautions/Allergies:   Ivp dye [fd and c blue no.1]; Codeine; Iodine; Macrodantin [nitrofurantoin macrocrystalline]; Morphine; Povidone-iodine; and Sulfa (sulfonamide antibiotics)   Fall Risk Score: 4 (? 5 = High Risk)  MD Orders: eval and treat MEDICAL/REFERRING DIAGNOSIS:  Unsteadiness on feet [R26.81]   DATE OF ONSET: chronic, and gradual onset  REFERRING PHYSICIAN: Elías Fine NP  RETURN PHYSICIAN APPOINTMENT: TBD     INITIAL ASSESSMENT:  Ms. Kati Lewis presents in wheelchair, with sitter. She is not able to give any accurate history, or to communicate in any really meaningful way. She has apparently had a significant, recent? decline in physical ability/mobility. According to chart, medical history is extensive; referral is for mobility, gait and balance exercise. At time of eval pt was at max to mod asst level for transfers, mat mobility, and sit to stand. Did not attempt ambulation with walker; pt reports pt can amb with walker and min to mod asst about 20'. PT will focus on repetitive mobility activities, standing balance and gait activities, to try to improve overall ability to move about, and to lessen the care burden on others. PROBLEM LIST (Impacting functional limitations):  1. Decreased Strength  2. Decreased ADL/Functional Activities  3. Decreased Transfer Abilities  4. Decreased Ambulation Ability/Technique  5. Decreased Balance  6. Decreased Activity Tolerance  7. Decreased Flexibility/Joint Mobility  8. Edema/Girth INTERVENTIONS PLANNED:  1. Balance Exercise  2. Bed Mobility  3. Gait Training  4.  Home Exercise Program (HEP)  5. Range of Motion (ROM)  6. Therapeutic Exercise/Strengthening   TREATMENT PLAN:  Effective Dates: 7- TO 10-2-2017. Frequency/Duration: 1 time a week for 8 weeks, and upon reassessment will adjust frequency and duration as progress indicates. GOALS: (Goals have been discussed and agreed upon with patient.)  Short-Term Functional Goals: Time Frame: 4 weeks  1. Transfers to/from wheelchair with min asst  2. Good dynamic sitting balance  3. Able to stand with single arm support for > 3 min, for asst with ADL activities. Discharge Goals: Time Frame: 8 weeks  1. Mat mobility with min to contact guard asst  2. Pt able to amb with rolling walker > 25' with min to contact guard asst  Rehabilitation Potential For Stated Goals: [address], I certify that the treatment plan above will be carried out by a therapist or under their direction. Thank you for this referral,  Shanika Meneses PT     Referring Physician Signature: Oumou Cristoabl NP              Date                    The information in this section was collected on 7- (except where otherwise noted). HISTORY:   History of Present Injury/Illness (Reason for Referral): Pt presents in wheelchair, with sitter assisting. She has very apparent cognitive deficits, and cannot relate her history, cannot complete sentences or thoughts. Her medical history, as noted below, is extensive, and includes many systemic problems, including CVA in 2009, chronic kidney failure, CAD, CHF ( has some swelling and discoloration in B feet);  S/p ?R hip fx with steffanie, CABG, back surgery, diabetes and neuropathy. At time of eval she required max asst to transfer from wheelchair to mat, and demonstrated poor siting balance at EOM. She is referred to help with gait and balance. Past Medical History/Comorbidities:   Ms. Harrison Perez  has a past medical history of Abdominal aneurysm without mention of rupture (09/29/09);  Abdominal pain, epigastric (05/06/10); Abdominal pain, generalized (12/07/10); [facility] ([facility] (5/18/2011); Acute, but ill-defined, cerebrovascular disease (09/29/09); Aneurysm ([facility] Anxiety state, unspecified (12/06/13); Arrhythmia; Arthritis; Atrial fibrillation ([facility] (12/21/09); Backache, unspecified (08/26/10); CAD (coronary artery disease) (2000); CHF - Chronic combined systolic \T\ diastolic (8/7/2873); Chronic pain; Chronic systolic heart failure ([facility] (03/22/10); Chronic UTI; CKD (chronic kidney disease), unspecified (7/6/2016); Congestive heart failure, unspecified (10/28/09); Coronary atherosclerosis of unspecified type of vessel, native or graft (09/22/09); Dehydration (10/28/09); Dementia (11/27/2009); Depression; Depressive disorder, not elsewhere classified (07/26/10); Diabetes ([facility] Diarrhea (09/05/12); Diverticulitis of colon (without mention of hemorrhage) (12/29/09); Dysuria (12/21/09); Gastrointestinal disorder; GERD (gastroesophageal reflux disease) (11/27/2009); Heart failure ([facility] Hematuria, unspecified (03/10/10); Hemorrhage of gastrointestinal tract, unspecified (12/30/09); Hemorrhage of rectum and anus (12/29/09); HTN (09/22/09); Hypotension due to drugs (7/6/2016); Hypotension, unspecified (10/13/09); Hypothyroidism; Insomnia, unspecified (12/06/13); Intestinal infection due to other organism, not elsewhere classified (03/22/10); Irritable bowel syndrome (05/12/10); Lumbago (09/09/10); Midline low back pain with sciatica; Mononeuritis of unspecified site (08/22/13); Muscle weakness (generalized) (10/13/09); Nonspecific abnormal results of liver function study (12/07/10); Nonspecific elevation of levels of transaminase or lactic acid dehydrogenase (LDH) (11/09/10); Other and unspecified hyperlipidemia (09/22/09); Other degenerative diseases of the basal ganglia (05/12/10); Other ill-defined conditions; Other nonspecific finding on examination of urine (02/17/10);  Other persistent mental disorders due to conditions classified elsewhere (03/22/10); 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); Unspecified adverse effect of anesthesia; Unspecified hypothyroidism (10/28/09); Unspecified symptom associated with female genital organs (09/09/10); Unspecified urinary incontinence (02/17/10); and Urosepsis (9/4/2014). She also has no past medical history of Autoimmune disease ([facility] Difficult intubation; Liver disease; Pseudocholinesterase deficiency; PUD (peptic ulcer disease); Seizures ([facility] Thromboembolus ([facility] or Unspecified sleep apnea. Ms. Emily Buck  has a past surgical history that includes cabg, arterial, three (2000); total abdominal hysterectomy; bladder suspension; cardiac surg procedure unlist; appendectomy; cholecystectomy; orthopaedic; hernia repair; gyn; heent; colonoscopy; and heart catheterization (4/26/94). Social History/Living Environment:     . Lives at home, with family support and sitters. Prior Level of Function/Work/Activity:  Long ago retired. Did  work. Dominant Side:         RIGHTPersonal Factors:          Age:  80 y.o. Current Medications:       Current Outpatient Prescriptions:     cephALEXin (KEFLEX) 250 mg capsule, Take 1 Cap by mouth daily. , Disp: 30 Cap, Rfl: 2    sertraline (ZOLOFT) 100 mg tablet, Take 1 Tab by mouth daily. , Disp: 90 Tab, Rfl: 4    levothyroxine (SYNTHROID) 112 mcg tablet, Take 1 Tab by mouth Daily (before breakfast). , Disp: 90 Tab, Rfl: 4    HYDROcodone-acetaminophen (NORCO) 5-325 mg per tablet, Take 1 Tab by mouth every four (4) hours as needed for Pain.  Max Daily Amount: 6 Tabs., Disp: 120 Tab, Rfl: 0    memantine (NAMENDA) 10 mg tablet, Take 1 Tab by mouth two (2) times a day., Disp: 180 Tab, Rfl: 3    amitriptyline (ELAVIL) 25 mg tablet, Take 1 Tab by mouth nightly., Disp: 90 Tab, Rfl: 4    rosuvastatin (CRESTOR) 10 mg tablet, Take 1 Tab by mouth nightly., Disp: 90 Tab, Rfl: 3    LORazepam (ATIVAN) 1 mg tablet, Take 1 Tab by mouth nightly as needed for Anxiety. Max Daily Amount: 1 mg., Disp: 30 Tab, Rfl: 5    diphenoxylate-atropine (LOMOTIL) 2.5-0.025 mg per tablet, Take 1 Tab by mouth four (4) times daily as needed for Diarrhea (1 tab after each stool for max 8 per day). Max Daily Amount: 4 Tabs. Take after each stool for a maximum of 8 tablets daily, Disp: 20 Tab, Rfl: 0    carvedilol (COREG) 6.25 mg tablet, Take 1 Tab by mouth two (2) times daily (with meals). , Disp: 180 Tab, Rfl: 3    raNITIdine (ZANTAC) 300 mg tablet, Take 1 Tab by mouth nightly., Disp: 90 Tab, Rfl: 3    rivaroxaban (XARELTO) 15 mg tab tablet, Take 1 Tab by mouth daily (with breakfast). , Disp: 90 Tab, Rfl: 3    ALPRAZolam (XANAX) 0.25 mg tablet, Take 0.25 mg by mouth daily as needed. , Disp: , Rfl:    Date Last Reviewed:  7-   Number of Personal Factors/Comorbidities that affect the Plan of Care: 3+: HIGH COMPLEXITY   EXAMINATION:   Observation/Orthostatic Postural Assessment:          Pt presents in wheelchair. Both feet have moderate edema around the ankles, and show some purple-jeremias discoloration. ROM:          Active bilat UE shoulder range is limited to about shoulder height. Can put hands on head, but not overhead. LEs have functional passive range. R hip shows very tight adductors and internal rotation. Difficult for her to get the leg to neutral.   Strength:          Generally 3 to 3+ throughout. Poorly coordinated UE movement. Cannot use UEs effectively to propel wc. Functional Mobility:         Gait/Ambulation:  Did not attempt ambulation at initial assessment. Max asst for sit to stand. Was able to do some static stepping while holding walker. Transfers:  Max asst wc to from mat.   Sitting balance is not reliable, pt drifts to the L. Bed Mobility:  Mod to max asst for sit to supine and supine to sit on mat. Wheelchair:  Not very effective for self propulsion--sort of uses feet and sort of uses hands. Body Structures Involved:  1. Voice/Speech  2. Joints  3. Muscles Body Functions Affected:  1. Mental  2. Cardio  3. Neuromusculoskeletal  4. Movement Related  5. Skin Related Activities and Participation Affected:  1. Learning and Applying Knowledge  2. General Tasks and Demands  3. Communication  4. Mobility  5. Self Care  6. Interpersonal Interactions and Relationships  7. Community, Social and [place]   Number of elements (examined above) that affect the Plan of Care: 3: MODERATE COMPLEXITY   CLINICAL PRESENTATION:   Presentation: Evolving clinical presentation with unstable and unpredictable characteristics: HIGH COMPLEXITY   CLINICAL DECISION MAKING:   Outcome Measure: Tool Used: Garza Balance Scale  Score:  Initial: 3/56 Most Recent: X/56 (Date: -- )   Interpretation of Score: Each section is scored on a 0-4 scale, 0 representing the patients inability to perform the task and 4 representing independence. The scores of each section are added together for a total score of 56. The higher the patients score, the more independent the patient is. Any score below 45 indicates increased risk for falls. Score 56 55-45 44-34 33-23 22-12 11-1 0   Modifier CH CI CJ CK CL CM CN     ? Mobility - Walking and Moving Around:     - CURRENT STATUS: CM - 80%-99% impaired, limited or restricted    - GOAL STATUS: CL - 60%-79% impaired, limited or restricted    - D/C STATUS:  ---------------To be determined---------------      Medical Necessity:   · Skilled intervention continues to be required due to multiple medical issues going on and her declining level of mobility. .  Reason for Services/Other Comments:  · Patient continues to require skilled intervention due to the need for exercises and for assist with mobility. .   Use of outcome tool(s) and clinical judgement create a POC that gives a: Difficult prediction of patient's progress: HIGH COMPLEXITY            TREATMENT:   (In addition to Assessment/Re-Assessment sessions the following treatments were rendered)    Pre-treatment Symptoms/Complaints:  Patient's caregiver states she has had a bad morning with her bowels. Pain: Initial: Pain Intensity 1: 2  Post Session:  1       Therapeutic Exercise  ( 45min):  Exercises to improve general strength, balance, and mobility. Will progress intensity of exercise and decrease assistance provided as patient's progress allows. Exercise performed today:  LAQs 2 x 12  Sit to stand 1 x 5 with CGA  Hamstring curls with yellow tband seated 2 x 12  Ball squeeze 2 x 12  Hip abd with resistance(yellow t band) 2 x 12  Seated rows with yellow t band 2 x 12  Weightshift with min assistance x 5 on each leg  Ambulation with 2 therapy assistance for about 20 ft. Held all other exercises today:  Alternate hip flexion   LAQs--1 x 12  SLR--1 x 10 ---needs min asst, mostly for R leg. Bridging--2 x 10, needs min contact guard to facilitate- held   Hip abd with manual  Guidance in hooklying- held  Alternate hip flexion in hooklying- held   Practiced sit to stand --static standing at walker. Static stepping, cues for improved control with R leg  Did UE activity with cones in standing, but needed min asst for balance . Standing heel raises--1 x 10---quality is poor   Walked with walker 2 short trips of ~ 8-10 ft, with min asst. - held    Nustep, needs constant cues to continue---6 min  Held     Treatment/Session Assessment:  Pt tolerated treatment better  today. Patient was able to ambulate with the assistance of two therapist about 20 ft. She required both verbal and tactlate cues throughout treatment.  Rom Ewing states that she had some trouble with her bowels this morning, but seemed to transfer better getting in the car both coming and going. She seemed to tire easy even with all  exercises. Very pleasant and showed a willingness to try. · Response to Treatment:  Slight improvement in ease of movement. · Compliance with Program/Exercises: Will assess as treatment progresses. · Recommendations/Intent for next treatment session:     Next visit will focus on gross motor work for mobility, sit to stand and balance.   Total Treatment Duration:   45 min  Total number of treatments: 4     PT Patient Time In/Time Out  Time In: 1115  Time Out: 1250 [place], [place]

## 2017-08-23 NOTE — PROGRESS NOTES
Yovany Martines  : 5/15/1931 Therapy [facility] at [facility]  [address], [facility], [address]  [phone]   [phone]          OUTPATIENT PHYSICAL THERAPY:Daily Note 2017    ICD-10: Treatment Diagnosis: R26.2  Difficulty walking  M62.81  Generalized muscle weakness  Precautions/Allergies:   Ivp dye [fd and c blue no.1]; Codeine; Iodine; Macrodantin [nitrofurantoin macrocrystalline]; Morphine; Povidone-iodine; and Sulfa (sulfonamide antibiotics)   Fall Risk Score: 4 (? 5 = High Risk)  MD Orders: eval and treat MEDICAL/REFERRING DIAGNOSIS:  Unsteadiness on feet [R26.81]   DATE OF ONSET: chronic, and gradual onset  REFERRING PHYSICIAN: Lance Galicia NP  RETURN PHYSICIAN APPOINTMENT: TBD     INITIAL ASSESSMENT:  Ms. Vasquez Noble presents in wheelchair, with sitter. She is not able to give any accurate history, or to communicate in any really meaningful way. She has apparently had a significant, recent? decline in physical ability/mobility. According to chart, medical history is extensive; referral is for mobility, gait and balance exercise. At time of eval pt was at max to mod asst level for transfers, mat mobility, and sit to stand. Did not attempt ambulation with walker; pt reports pt can amb with walker and min to mod asst about 20'. PT will focus on repetitive mobility activities, standing balance and gait activities, to try to improve overall ability to move about, and to lessen the care burden on others. PROBLEM LIST (Impacting functional limitations):  1. Decreased Strength  2. Decreased ADL/Functional Activities  3. Decreased Transfer Abilities  4. Decreased Ambulation Ability/Technique  5. Decreased Balance  6. Decreased Activity Tolerance  7. Decreased Flexibility/Joint Mobility  8. Edema/Girth INTERVENTIONS PLANNED:  1. Balance Exercise  2. Bed Mobility  3. Gait Training  4.  Home Exercise Program (HEP)  5. Range of Motion (ROM)  6. Therapeutic Exercise/Strengthening   TREATMENT PLAN:  Effective Dates: 7- TO 10-2-2017. Frequency/Duration: 1 time a week for 8 weeks, and upon reassessment will adjust frequency and duration as progress indicates. GOALS: (Goals have been discussed and agreed upon with patient.)  Short-Term Functional Goals: Time Frame: 4 weeks  1. Transfers to/from wheelchair with min asst  2. Good dynamic sitting balance  3. Able to stand with single arm support for > 3 min, for asst with ADL activities. Discharge Goals: Time Frame: 8 weeks  1. Mat mobility with min to contact guard asst  2. Pt able to amb with rolling walker > 25' with min to contact guard asst  Rehabilitation Potential For Stated Goals: [address] therapy, I certify that the treatment plan above will be carried out by a therapist or under their direction. Thank you for this referral,  Dionne Leiva, PT     Referring Physician Signature: Deacon Gracia NP              Date                    The information in this section was collected on 7- (except where otherwise noted). HISTORY:   History of Present Injury/Illness (Reason for Referral): Pt presents in wheelchair, with sitter assisting. She has very apparent cognitive deficits, and cannot relate her history, cannot complete sentences or thoughts. Her medical history, as noted below, is extensive, and includes many systemic problems, including CVA in 2009, chronic kidney failure, CAD, CHF ( has some swelling and discoloration in B feet);  S/p ?R hip fx with steffanie, CABG, back surgery, diabetes and neuropathy. At time of eval she required max asst to transfer from wheelchair to mat, and demonstrated poor siting balance at EOM. She is referred to help with gait and balance. Past Medical History/Comorbidities:   Ms. Katherin Higgins  has a past medical history of Abdominal aneurysm without mention of rupture (09/29/09);  Abdominal pain, epigastric (05/06/10); Abdominal pain, generalized (12/07/10); Acute renal failure ([facility] (5/18/2011); Acute, but ill-defined, cerebrovascular disease (09/29/09); Aneurysm ([facility] Anxiety state, unspecified (12/06/13); Arrhythmia; Arthritis; Atrial fibrillation ([facility] (12/21/09); Backache, unspecified (08/26/10); CAD (coronary artery disease) (2000); CHF - Chronic combined systolic \T\ diastolic (3/7/8756); Chronic pain; Chronic systolic heart failure ([facility] (03/22/10); Chronic UTI; CKD (chronic kidney disease), unspecified (7/6/2016); Congestive heart failure, unspecified (10/28/09); Coronary atherosclerosis of unspecified type of vessel, native or graft (09/22/09); Dehydration (10/28/09); Dementia (11/27/2009); Depression; Depressive disorder, not elsewhere classified (07/26/10); Diabetes ([facility] Diarrhea (09/05/12); Diverticulitis of colon (without mention of hemorrhage) (12/29/09); Dysuria (12/21/09); Gastrointestinal disorder; GERD (gastroesophageal reflux disease) (11/27/2009); Heart failure ([facility] Hematuria, unspecified (03/10/10); Hemorrhage of gastrointestinal tract, unspecified (12/30/09); Hemorrhage of rectum and anus (12/29/09); HTN (09/22/09); Hypotension due to drugs (7/6/2016); Hypotension, unspecified (10/13/09); Hypothyroidism; Insomnia, unspecified (12/06/13); Intestinal infection due to other organism, not elsewhere classified (03/22/10); Irritable bowel syndrome (05/12/10); Lumbago (09/09/10); Midline low back pain with sciatica; Mononeuritis of unspecified site (08/22/13); Muscle weakness (generalized) (10/13/09); Nonspecific abnormal results of liver function study (12/07/10); Nonspecific elevation of levels of transaminase or lactic acid dehydrogenase (LDH) (11/09/10); Other and unspecified hyperlipidemia (09/22/09); Other degenerative diseases of the basal ganglia (05/12/10); Other ill-defined conditions; Other nonspecific finding on examination of urine (02/17/10);  Other persistent mental disorders due to conditions classified elsewhere (03/22/10); 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 She also has no past medical history of Autoimmune disease ([facility] Difficult intubation; Liver disease; Pseudocholinesterase deficiency; PUD (peptic ulcer disease); Seizures ([facility] Thromboembolus ([facility] or Unspecified sleep apnea. Ms. Karri Nicole  has a past surgical history that includes cabg, arterial, three (2000); total abdominal hysterectomy; bladder suspension; cardiac surg procedure unlist; appendectomy; cholecystectomy; orthopaedic; hernia repair; gyn; heent; colonoscopy; and heart catheterization (4/26/94). Social History/Living Environment:     . Lives at home, with family support and sitters. Prior Level of Function/Work/Activity:  Long ago retired. Did  work. Dominant Side:         RIGHTPersonal Factors:          Age:  80 y.o. Current Medications:       Current Outpatient Prescriptions:     cephALEXin (KEFLEX) 250 mg capsule, Take 1 Cap by mouth daily. , Disp: 30 Cap, Rfl: 2    sertraline (ZOLOFT) 100 mg tablet, Take 1 Tab by mouth daily. , Disp: 90 Tab, Rfl: 4    levothyroxine (SYNTHROID) 112 mcg tablet, Take 1 Tab by mouth Daily (before breakfast). , Disp: 90 Tab, Rfl: 4    HYDROcodone-acetaminophen (NORCO) 5-325 mg per tablet, Take 1 Tab by mouth every four (4) hours as needed for Pain.  Max Daily Amount: 6 Tabs., Disp: 120 Tab, Rfl: 0    memantine (NAMENDA) 10 mg tablet, Take 1 Tab by mouth two (2) times a day., Disp: 180 Tab, Rfl: 3    amitriptyline (ELAVIL) 25 mg tablet, Take 1 Tab by mouth nightly., Disp: 90 Tab, Rfl: 4    rosuvastatin (CRESTOR) 10 mg tablet, Take 1 Tab by mouth nightly., Disp: 90 Tab, Rfl: 3    LORazepam (ATIVAN) 1 mg tablet, Take 1 Tab by mouth nightly as needed for Anxiety. Max Daily Amount: 1 mg., Disp: 30 Tab, Rfl: 5    diphenoxylate-atropine (LOMOTIL) 2.5-0.025 mg per tablet, Take 1 Tab by mouth four (4) times daily as needed for Diarrhea (1 tab after each stool for max 8 per day). Max Daily Amount: 4 Tabs. Take after each stool for a maximum of 8 tablets daily, Disp: 20 Tab, Rfl: 0    carvedilol (COREG) 6.25 mg tablet, Take 1 Tab by mouth two (2) times daily (with meals). , Disp: 180 Tab, Rfl: 3    raNITIdine (ZANTAC) 300 mg tablet, Take 1 Tab by mouth nightly., Disp: 90 Tab, Rfl: 3    rivaroxaban (XARELTO) 15 mg tab tablet, Take 1 Tab by mouth daily (with breakfast). , Disp: 90 Tab, Rfl: 3    ALPRAZolam (XANAX) 0.25 mg tablet, Take 0.25 mg by mouth daily as needed. , Disp: , Rfl:    Date Last Reviewed:  7-   Number of Personal Factors/Comorbidities that affect the Plan of Care: 3+: HIGH COMPLEXITY   EXAMINATION:   Observation/Orthostatic Postural Assessment:          Pt presents in wheelchair. Both feet have moderate edema around the ankles, and show some purple-jeremias discoloration. ROM:          Active bilat UE shoulder range is limited to about shoulder height. Can put hands on head, but not overhead. LEs have functional passive range. R hip shows very tight adductors and internal rotation. Difficult for her to get the leg to neutral.   Strength:          Generally 3 to 3+ throughout. Poorly coordinated UE movement. Cannot use UEs effectively to propel wc. Functional Mobility:         Gait/Ambulation:  Did not attempt ambulation at initial assessment. Max asst for sit to stand. Was able to do some static stepping while holding walker. Transfers:  Max asst wc to from mat.   Sitting balance is not reliable, pt drifts to the L. Bed Mobility:  Mod to max asst for sit to supine and supine to sit on mat. Wheelchair:  Not very effective for self propulsion--sort of uses feet and sort of uses hands. Body Structures Involved:  1. Voice/Speech  2. Joints  3. Muscles Body Functions Affected:  1. Mental  2. Cardio  3. Neuromusculoskeletal  4. Movement Related  5. Skin Related Activities and Participation Affected:  1. Learning and Applying Knowledge  2. General Tasks and Demands  3. Communication  4. Mobility  5. Self Care  6. Interpersonal Interactions and Relationships  7. Community, Social and [place]   Number of elements (examined above) that affect the Plan of Care: 3: MODERATE COMPLEXITY   CLINICAL PRESENTATION:   Presentation: Evolving clinical presentation with unstable and unpredictable characteristics: HIGH COMPLEXITY   CLINICAL DECISION MAKING:   Outcome Measure: Tool Used: Garza Balance Scale  Score:  Initial: 3/56 Most Recent: X/56 (Date: -- )   Interpretation of Score: Each section is scored on a 0-4 scale, 0 representing the patients inability to perform the task and 4 representing independence. The scores of each section are added together for a total score of 56. The higher the patients score, the more independent the patient is. Any score below 45 indicates increased risk for falls. Score 56 55-45 44-34 33-23 22-12 11-1 0   Modifier CH CI CJ CK CL CM CN     ? Mobility - Walking and Moving Around:     - CURRENT STATUS: CM - 80%-99% impaired, limited or restricted    - GOAL STATUS: CL - 60%-79% impaired, limited or restricted    - D/C STATUS:  ---------------To be determined---------------      Medical Necessity:   · Skilled intervention continues to be required due to multiple medical issues going on and her declining level of mobility. .  Reason for Services/Other Comments:  · Patient continues to require skilled intervention due to the need for exercises and for assist with mobility. .   Use of outcome tool(s) and clinical judgement create a POC that gives a: Difficult prediction of patient's progress: HIGH COMPLEXITY            TREATMENT:   (In addition to Assessment/Re-Assessment sessions the following treatments were rendered)    Pre-treatment Symptoms/Complaints:  Patient's caregiver states she has had a bad morning and that she was difficult to get dressed and get to therapy today. Pain: Initial: Pain Intensity 1: 4  Post Session:  3 to 2       Therapeutic Exercise  ( 45min):  Exercises to improve general strength, balance, and mobility. Will progress intensity of exercise and decrease assistance provided as patient's progress allows. Exercise performed today:  Transfers to mat--max asst.  Sitting balance is not good today. LAQs 2 x 12  Seated rows with red t band 2 x 12  Shoulder pull downs--red theraband--1 x 12  Sit to supine--mod asst  Lower trunk rotation--mod asst.  Much difficulty going toward L  assisted hip abd stretch  marching in hooklying  Tried bridging---pt could not clear hips at all, even with min asst  Static standing at walker--mas asst to stand, but once up, was able to stand without asst for ~ 20 sec    Held all other exercises today:  Practiced sit to stand --static standing at walker. Static stepping, cues for improved control with R leg  Did UE activity with cones in standing, but needed min asst for balance . Standing heel raises--1 x 10---quality is poor   Walked with walker 2 short trips of ~ 8-10 ft, with min asst. - held    Nustep, needs constant cues to continue---6 min  Held     Treatment/Session Assessment:  Pt tolerated treatment fair today, but her ability to participate was poor, low. She had significant difficulty with verbal responses. She required both verbal and manual cues throughout treatment.  Caregiver states that pt is not having a good day, and seems to be getting worse, flaco when she ( caregiver ) is not there--pt remains in bed most of the time if caregiver not there. Still working to get care shifted to [facility] for PT, as this will be more appropriate with pt at her current level of immobility. · Response to Treatment:  Slight improvement in ease of movement. · Compliance with Program/Exercises: Will assess as treatment progresses. · Recommendations/Intent for next treatment session:     Next visit will focus on gross motor work for mobility, sit to stand and balance.   Total Treatment Duration:   45 min  Total number of treatments:   5    PT Patient Time In/Time Out  Time In: 1115  Time Out: 20 [facility], PT,

## 2017-08-31 NOTE — PROGRESS NOTES
Malinda Martines  : 5/15/1931 Therapy [facility] at [facility]  [address], [facility] [address]  [phone]   [phone]        OUTPATIENT DAILY NOTE    NAME/AGE/GENDER: King Dequan is a 80 y.o. female. DATE: 2017    Patient was taken off the appointment scheduled for today due to her care being transferred to [address] [place] [address]. Due to her extremely limited mobility, it seems more appropriate for her therapy to be done at home.       iLzabeth Ghotra, PT

## 2017-10-09 NOTE — PROGRESS NOTES
Thang Martines  : 5/15/1931 Therapy [facility] at [facility]  [address], [address]  [phone]   [phone]          OUTPATIENT PHYSICAL THERAPY:Discontinuation Summary 10/9/2017    ICD-10: Treatment Diagnosis: R26.2  Difficulty walking  M62.81  Generalized muscle weakness  Precautions/Allergies:   Ivp dye [fd and c blue no.1]; Codeine; Iodine; Macrodantin [nitrofurantoin macrocrystalline]; Morphine; Povidone-iodine; and Sulfa (sulfonamide antibiotics)   Fall Risk Score: 4 (? 5 = High Risk)  MD Orders: eval and treat MEDICAL/REFERRING DIAGNOSIS:  Unsteadiness on feet [R26.81]   DATE OF ONSET: chronic, and gradual onset  REFERRING PHYSICIAN: Samuella Burkitt, NP  RETURN PHYSICIAN APPOINTMENT: TBD     DISCHARGE ASSESSMENT:  Ms. Amairani Rosario presented in wheelchair, with sitter. She was not able to give any accurate history, or to communicate in any really meaningful way. She has apparently had a significant, recent? decline in physical ability/mobility. According to chart, medical history is extensive; referral is for mobility, gait and balance exercise. At time of eval pt was at max to mod asst level for transfers, mat mobility, and sit to stand. Did not attempt ambulation with walker; pt reports pt can amb with walker and min to mod asst about 20'. Pt attended 6 visits to PT, for general balance, transfers, and strengthening ex. She had significant variability in her ability to participate in therapy, and seemed to be generally getting less alert and more physically dependent. Sitter reported having increasing difficulty with getting her dressed and to therapy consistently. Discussed that she is probably more appropriate for home health PT services than out-pt at this time. Contacted her [address] [facility] office and suggested referral to [facility]. Physical therapy ( at this clinic) is discontinued at this time.       1.        GOALS: (Goals have been discussed and agreed upon with patient.)  Short-Term Functional Goals: Time Frame: 4 weeks-- did not meet goals  1. Transfers to/from wheelchair with min asst  2. Good dynamic sitting balance  3. Able to stand with single arm support for > 3 min, for asst with ADL activities. Discharge Goals: Time Frame: 8 weeks  1. Mat mobility with min to contact guard asst  2. Pt able to amb with rolling walker > 25' with min to contact guard asst    TREATMENT PLAN:  Physical therapy is discontinued. Pt is referred for PT through [facility] services.    Jennie Garza, PT

## 2018-01-10 PROBLEM — M15.9 PRIMARY OSTEOARTHRITIS INVOLVING MULTIPLE JOINTS: Chronic | Status: ACTIVE | Noted: 2018-01-01

## 2018-01-10 PROBLEM — E55.9 VITAMIN D DEFICIENCY: Status: ACTIVE | Noted: 2018-01-01

## 2018-01-10 PROBLEM — E55.9 VITAMIN D DEFICIENCY: Chronic | Status: ACTIVE | Noted: 2018-01-01
